# Patient Record
Sex: MALE | Race: WHITE | Employment: FULL TIME | URBAN - METROPOLITAN AREA
[De-identification: names, ages, dates, MRNs, and addresses within clinical notes are randomized per-mention and may not be internally consistent; named-entity substitution may affect disease eponyms.]

---

## 2018-06-12 ENCOUNTER — HOSPITAL ENCOUNTER (OUTPATIENT)
Dept: PHYSICAL THERAPY | Age: 48
Discharge: HOME | End: 2018-06-12
Payer: COMMERCIAL

## 2018-06-12 PROCEDURE — 97161 PT EVAL LOW COMPLEX 20 MIN: CPT

## 2018-06-12 PROCEDURE — 97140 MANUAL THERAPY 1/> REGIONS: CPT

## 2018-06-12 PROCEDURE — 97110 THERAPEUTIC EXERCISES: CPT

## 2018-06-12 NOTE — PROGRESS NOTES
[facility]  Khari [address]  [place], [address]  [phone] fax    Plan of Care/ Statement of Necessity for Physical Therapy Services    Patient name: Roman Duenas Start of Care: 2018   Referral source: Karina Jones MD : 1970    Medical Diagnosis: Bursitis of right shoulder [M75.51]   Onset Date:2018    Treatment Diagnosis: Right RTC repair   Prior Hospitalization: see medical history Provider#: [id number]   Medications: Verified on Patient summary List    Comorbidities: depression, recent Rhabdomyolysis   Prior Level of Function: Pt RHD male able to fully participate in work duties, tennis and kayaking     The Plan of Care and following information is based on the information from the initial evaluation. Assessment/ key information: Pt is a 52 y/o M presenting s/p right RTC \"small\" repair on 2018. Pt reports he initially injured the shoulder white water kayaking late  with limited response to conservative treatment. Pt demonstrates good PROM of right shoulder at this time with no end feel appreciated. Good AROM of proximal and distal joints. He does report a recent bout of Rhabdomyolysis about 6 weeks ago but has been cleared and doing well. Pt would benefit from PT to improve ROM, functional independence and return to PLOF.     Evaluation Complexity History MEDIUM  Complexity : 1-2 comorbidities / personal factors will impact the outcome/ POC ; Examination LOW Complexity : 1-2 Standardized tests and measures addressing body structure, function, activity limitation and / or participation in recreation  ;Presentation LOW Complexity : Stable, uncomplicated  ;Clinical Decision Making MEDIUM Complexity : FOTO score of 26-74  Overall Complexity Rating: LOW   Problem List: pain affecting function, decrease ROM, decrease strength, decrease ADL/ functional abilitiies, decrease activity tolerance and decrease flexibility/ joint mobility   Treatment Plan may include any combination of the following: Therapeutic exercise, Therapeutic activities, Neuromuscular re-education, Physical agent/modality, Manual therapy, Patient education, Self Care training and Functional mobility training  Patient / Family readiness to learn indicated by: asking questions and trying to perform skills  Persons(s) to be included in education: patient (P)  Barriers to Learning/Limitations: None  Patient Goal (s): Resume normal activity  Patient Self Reported Health Status: excellent  Rehabilitation Potential: excellent    Short Term Goals: To be accomplished in 2 weeks:  1. Pt will be I and compliant with HEP to promote active role in rehabilitation process. 2. Pt will demonstrate 140 deg PROM right shoulder scaption without pain for progression into AAROM. Long Term Goals: To be accomplished in 6 weeks:  1. Pt will demonstrate 130 deg left shoulder elevation AAROM to improve functional mobility and ADL ease. 2. Pt will demonstrate right shoulder ER PROM to 50 deg for improved ease of self care and grooming. 3. Pt will demonstrate 3+/5 right shoulder strength for improved ease of ADLs. 4. Pt will improve FOTO score to 66 to demonstrate improved quality of life. Frequency / Duration: Patient to be seen 2 times per week for 6 weeks. Patient/ Caregiver education and instruction: Diagnosis, prognosis, self care, activity modification and exercises   [x]  Plan of care has been reviewed with TUTU Erickson DPT, CMTPT 6/12/2018 11:57 AM    ________________________________________________________________________    I certify that the above Therapy Services are being furnished while the patient is under my care. I agree with the treatment plan and certify that this therapy is necessary.     [de-identified] Signature:____________________  Date:____________Time: _________    Please sign and return to [facility] [address]  [address] [facility] [address], [facility] [address]  [phone] fax

## 2018-06-12 NOTE — PROGRESS NOTES
PT DAILY TREATMENT NOTE     Patient Name: Maria Del Rosario Meraz  Date:2018  : 1970  [x]  Patient  Verified  Payor: Lexie Diane / Plan: [address] [place] HMO / Product Type: HMO /    In time:11:32  Out time:11:56  Total Treatment Time (min): 24  Visit #: 1 of 12    Treatment Area: Bursitis of right shoulder [M75.51]    SUBJECTIVE  Pain Level (0-10 scale): 0-1  Any medication changes, allergies to medications, adverse drug reactions, diagnosis change, or new procedure performed?: [x] No    [] Yes (see summary sheet for update)  Subjective functional status/changes:   [] No changes reported  Pt reports his pain is well controlled at this time    OBJECTIVE    Modality rationale: PD to improve the patients ability to    Min Type Additional Details    [] Estim:  []Unatt       []IFC  []Premod                        []Other:  []w/ice   []w/heat  Position:  Location:    [] Estim: []Att    []TENS instruct  []NMES                    []Other:  []w/US   []w/ice   []w/heat  Position:  Location:    []  Traction: [] Cervical       []Lumbar                       [] Prone          []Supine                       []Intermittent   []Continuous Lbs:  [] before manual  [] after manual    []  Ultrasound: []Continuous   [] Pulsed                           []1MHz   []3MHz W/cm2:  Location:    []  Iontophoresis with dexamethasone         Location: [] Take home patch   [] In clinic    []  Ice     []  heat  []  Ice massage  []  Laser   []  Anodyne Position:  Location:    []  Laser with stim  []  Other:  Position:  Location:    []  Vasopneumatic Device Pressure:       [] lo [] med [] hi   Temperature: [] lo [] med [] hi   [] Skin assessment post-treatment:  []intact []redness- no adverse reaction    []redness  adverse reaction:     8 min [x]Eval                  []Re-Eval       8 min Therapeutic Exercise:  [] See flow sheet :   Rationale: increase ROM to improve the patients ability to perform daily tasks and ADLs    8 min Manual Therapy:  PROM right shoulder/ STJ mobs   Rationale: increase ROM and increase tissue extensibility to improve ease of ADLs              With   [] TE   [] TA   [] neuro   [] other: Patient Education: [x] Review HEP    [] Progressed/Changed HEP based on:   [] positioning   [] body mechanics   [] transfers   [] heat/ice application    [] other:      Other Objective/Functional Measures: see initial eval     Pain Level (0-10 scale) post treatment: 0-1    ASSESSMENT/Changes in Function: see POC    Patient will continue to benefit from skilled PT services to modify and progress therapeutic interventions, address functional mobility deficits, address ROM deficits, address strength deficits, analyze and address soft tissue restrictions, analyze and cue movement patterns and analyze and modify body mechanics/ergonomics to attain remaining goals. []  See Plan of Care  []  See progress note/recertification  []  See Discharge Summary         Progress towards goals / Updated goals:  Short Term Goals: To be accomplished in 2 weeks:  1. Pt will be I and compliant with HEP to promote active role in rehabilitation process. 2. Pt will demonstrate 140 deg PROM right shoulder scaption without pain for progression into AAROM. Long Term Goals: To be accomplished in 6 weeks:  1. Pt will demonstrate 130 deg left shoulder elevation AAROM to improve functional mobility and ADL ease. 2. Pt will demonstrate right shoulder ER PROM to 50 deg for improved ease of self care and grooming. 3. Pt will demonstrate 3+/5 right shoulder strength for improved ease of ADLs. 4. Pt will improve FOTO score to 66 to demonstrate improved quality of life.     PLAN  []  Upgrade activities as tolerated     [x]  Continue plan of care  []  Update interventions per flow sheet       []  Discharge due to:_  []  Other:_      Du Stoll DPT, CMTPT 6/12/2018  12:09 PM    Future Appointments  Date Time Provider Melanie Reddy   6/14/2018 3:00 PM Jonathan Sharma Kobi, PTA [facility]   6/18/2018 5:00 PM Eugsimon Bills, PTA [facility]   6/20/2018 3:00 PM Florence Bills, PTA [facility]   6/25/2018 7:30 AM Sukumar Alatorre [facility]   6/28/2018 5:30 PM [address] [facility]   7/2/2018 5:30 PM Florence Bills, PTA [facility]   7/6/2018 2:00 PM Sukumar Alatorre [facility]

## 2018-06-14 ENCOUNTER — HOSPITAL ENCOUNTER (OUTPATIENT)
Dept: PHYSICAL THERAPY | Age: 48
Discharge: HOME | End: 2018-06-14
Payer: COMMERCIAL

## 2018-06-14 PROCEDURE — 97140 MANUAL THERAPY 1/> REGIONS: CPT

## 2018-06-14 PROCEDURE — 97110 THERAPEUTIC EXERCISES: CPT

## 2018-06-14 NOTE — PROGRESS NOTES
PT DAILY TREATMENT NOTE     Patient Name: Roman Duenas  Date:2018  : 1970  [x]  Patient  Verified  Payor: Feli Davidson / Plan: [employer] / Product Type: HMO /    In time:3:06  Out time:3:23  Total Treatment Time (min): 17  Visit #: 2 of 12    Treatment Area: Bursitis of right shoulder [M75.51]  Right shoulder pain [M25.511]    SUBJECTIVE  Pain Level (0-10 scale): 1/10  Any medication changes, allergies to medications, adverse drug reactions, diagnosis change, or new procedure performed?: [x] No    [] Yes (see summary sheet for update)  Subjective functional status/changes:   [] No changes reported  Pt reports no new complaints Pt reports has no difficulty sleeping and his pain is minor without taking pain meds. OBJECTIVE    9 min Therapeutic Exercise:  [x] See flow sheet :   Rationale: increase ROM and increase strength to improve the patients ability to tolerate ADLs. 8 min Manual Therapy:  PROM to right shoulder per protocol   Rationale: decrease pain, increase ROM and increase tissue extensibility to improve tolerance to functional activities. With   [] TE   [] TA   [] neuro   [] other: Patient Education: [x] Review HEP    [] Progressed/Changed HEP based on:   [] positioning   [] body mechanics   [] transfers   [] heat/ice application    [] other:      Other Objective/Functional Measures: Initiated exercises as per flow sheet. Pain Level (0-10 scale) post treatment: 1/10    ASSESSMENT/Changes in Function: Pt demonstrates good available PROM without any guarding. Patient will continue to benefit from skilled PT services to modify and progress therapeutic interventions, address functional mobility deficits, address ROM deficits, address strength deficits, analyze and address soft tissue restrictions, analyze and cue movement patterns and analyze and modify body mechanics/ergonomics to attain remaining goals.      []  See Plan of Care  []  See progress note/recertification  []  See Discharge Summary         Progress towards goals / Updated goals:  Short Term Goals: To be accomplished in 2 weeks:  1. Pt will be I and compliant with HEP to promote active role in rehabilitation process. - Met per patient report. 6/14/18  2. Pt will demonstrate 140 deg PROM right shoulder scaption without pain for progression into AAROM. Long Term Goals: To be accomplished in 6 weeks:  1. Pt will demonstrate 130 deg left shoulder elevation AAROM to improve functional mobility and ADL ease. 2. Pt will demonstrate right shoulder ER PROM to 50 deg for improved ease of self care and grooming. 3. Pt will demonstrate 3+/5 right shoulder strength for improved ease of ADLs. 4. Pt will improve FOTO score to 66 to demonstrate improved quality of life.     PLAN  []  Upgrade activities as tolerated     [x]  Continue plan of care  []  Update interventions per flow sheet       []  Discharge due to:_  []  Other:_      Florina Butt PTA 6/14/2018  3:14 PM    Future Appointments  Date Time Provider Melanie Reddy   6/18/2018 5:00 PM Florina Butt PTA [facility]   6/20/2018 3:00 PM Florina Butt PTA [facility]   6/25/2018 7:30 AM Nisha Erickson [facility]   6/28/2018 5:30 PM [address] [facility]   7/2/2018 5:30 PM Florina Butt PTA [facility]   7/6/2018 2:00 PM [address] [facility]

## 2018-06-18 ENCOUNTER — HOSPITAL ENCOUNTER (OUTPATIENT)
Dept: PHYSICAL THERAPY | Age: 48
Discharge: HOME | End: 2018-06-18
Payer: COMMERCIAL

## 2018-06-18 PROCEDURE — 97140 MANUAL THERAPY 1/> REGIONS: CPT

## 2018-06-18 PROCEDURE — 97110 THERAPEUTIC EXERCISES: CPT

## 2018-06-18 NOTE — PROGRESS NOTES
PT DAILY TREATMENT NOTE     Patient Name: Cristóbal Mak  Date:2018  : 1970  [x]  Patient  Verified  Payor: Gladys Wyatt / Plan: [employer] / Product Type: HMO /    In time:5:00  Out time:5:20  Total Treatment Time (min): 20  Visit #: 3 of 12    Treatment Area: Bursitis of right shoulder [M75.51]  Right shoulder pain [M25.511]    SUBJECTIVE  Pain Level (0-10 scale): 1/10  Any medication changes, allergies to medications, adverse drug reactions, diagnosis change, or new procedure performed?: [x] No    [] Yes (see summary sheet for update)  Subjective functional status/changes:   [] No changes reported  Pt reports no new complaints of pain. Pt reports compliance with HEP. OBJECTIVE    12 min Therapeutic Exercise:  [x] See flow sheet :   Rationale: increase ROM and increase strength to improve the patients ability to tolerate ADLs. 8 min Manual Therapy:  PROM to right shoulder   Rationale: decrease pain, increase ROM and increase tissue extensibility to improve    With   [] TE   [] TA   [] neuro   [] other: Patient Education: [x] Review HEP    [] Progressed/Changed HEP based on:   [] positioning   [] body mechanics   [] transfers   [] heat/ice application    [] other:      Other Objective/Functional Measures: Pt demonstrates improved right shoulder PROM. Pain Level (0-10 scale) post treatment: 0/10    ASSESSMENT/Changes in Function: Pt continues to have minimal PROM restrictions noted with no pain. Discussed DCing abduction pillow next week. Patient will continue to benefit from skilled PT services to modify and progress therapeutic interventions, address functional mobility deficits, address ROM deficits, address strength deficits, analyze and address soft tissue restrictions, analyze and cue movement patterns and analyze and modify body mechanics/ergonomics to attain remaining goals.      []  See Plan of Care  []  See progress note/recertification  []  See Discharge Summary Progress towards goals / Updated goals:  Short Term Goals: To be accomplished in 2 weeks:  1. Pt will be I and compliant with HEP to promote active role in rehabilitation process. - Met per patient report. 6/14/18  2. Pt will demonstrate 140 deg PROM right shoulder scaption without pain for progression into AAROM. Long Term Goals: To be accomplished in 6 weeks:  1. Pt will demonstrate 130 deg left shoulder elevation AAROM to improve functional mobility and ADL ease. 2. Pt will demonstrate right shoulder ER PROM to 50 deg for improved ease of self care and grooming. 3. Pt will demonstrate 3+/5 right shoulder strength for improved ease of ADLs.   4. Pt will improve FOTO score to 66 to demonstrate improved quality of life.       PLAN  []  Upgrade activities as tolerated     [x]  Continue plan of care  []  Update interventions per flow sheet       []  Discharge due to:_  []  Other:_      Mino Delgadillo PTA 6/18/2018  5:06 PM    Future Appointments  Date Time Provider Melanie Reddy   6/20/2018 3:00 PM Mino Delgadillo PTA [facility]   6/25/2018 7:30 AM Pankaj Powell [facility]   6/28/2018 5:30 PM [address] [facility]   7/2/2018 5:30 PM Mino Delgadillo PTA [facility]   7/6/2018 2:00 PM [id number] [facility]

## 2018-06-20 ENCOUNTER — HOSPITAL ENCOUNTER (OUTPATIENT)
Dept: PHYSICAL THERAPY | Age: 48
Discharge: HOME | End: 2018-06-20
Payer: COMMERCIAL

## 2018-06-20 PROCEDURE — 97110 THERAPEUTIC EXERCISES: CPT

## 2018-06-20 PROCEDURE — 97140 MANUAL THERAPY 1/> REGIONS: CPT

## 2018-06-20 NOTE — PROGRESS NOTES
PT DAILY TREATMENT NOTE     Patient Name: Nnamdi Varela  Date:2018  : 1970  [x]  Patient  Verified  Payor: Kp Banegas / Plan: [employer] / Product Type: HMO /    In time:3:00  Out time:3:20  Total Treatment Time (min): 20  Visit #: 3 of 12    Treatment Area: Bursitis of right shoulder [M75.51]  Right shoulder pain [M25.511]    SUBJECTIVE  Pain Level (0-10 scale): 1/10  Any medication changes, allergies to medications, adverse drug reactions, diagnosis change, or new procedure performed?: [x] No    [] Yes (see summary sheet for update)  Subjective functional status/changes:   [] No changes reported  Pt reports no significant change in symptoms. Pt reports continued compliance with HEP. OBJECTIVE    12 min Therapeutic Exercise:  [x] See flow sheet :   Rationale: increase ROM and increase strength to improve the patients ability to tolerate ADLs. 8 min Manual Therapy:  PROM to right shoulder   Rationale: decrease pain, increase ROM and increase tissue extensibility to right shoulder        With   [] TE   [] TA   [] neuro   [] other: Patient Education: [x] Review HEP    [] Progressed/Changed HEP based on:   [] positioning   [] body mechanics   [] transfers   [] heat/ice application    [] other:      Other Objective/Functional Measures: Added pulleys. Pain Level (0-10 scale) post treatment: 0/10    ASSESSMENT/Changes in Function: Pt continues to demonstrate good shoulder mobility with PROM; informed patient we will cancel appointment on  if shoulder is still doing well on . Patient will continue to benefit from skilled PT services to modify and progress therapeutic interventions, address functional mobility deficits, address ROM deficits, address strength deficits, analyze and address soft tissue restrictions, analyze and cue movement patterns and analyze and modify body mechanics/ergonomics to attain remaining goals.      []  See Plan of Care  []  See progress note/recertification  []  See Discharge Summary         Progress towards goals / Updated goals:  Short Term Goals: To be accomplished in 2 weeks:  1. Pt will be I and compliant with HEP to promote active role in rehabilitation process. - Met per patient report. 6/14/18  2. Pt will demonstrate 140 deg PROM right shoulder scaption without pain for progression into AAROM. Long Term Goals: To be accomplished in 6 weeks:  1. Pt will demonstrate 130 deg left shoulder elevation AAROM to improve functional mobility and ADL ease. 2. Pt will demonstrate right shoulder ER PROM to 50 deg for improved ease of self care and grooming. 3. Pt will demonstrate 3+/5 right shoulder strength for improved ease of ADLs. 4. Pt will improve FOTO score to 66 to demonstrate improved quality of life.     PLAN  []  Upgrade activities as tolerated     [x]  Continue plan of care  []  Update interventions per flow sheet       []  Discharge due to:_  []  Other:_      Florenec Bills PTA 6/20/2018  3:11 PM    Future Appointments  Date Time Provider Melanie Reddy   6/25/2018 7:30 AM [id number] [facility]   6/28/2018 5:30 PM [address] [place] [address] [facility]   7/2/2018 5:30 PM Florence Bills PTA [facility]   7/6/2018 2:00 PM Sukumar Alatorre [facility]

## 2018-06-25 ENCOUNTER — APPOINTMENT (OUTPATIENT)
Dept: PHYSICAL THERAPY | Age: 48
End: 2018-06-25
Payer: COMMERCIAL

## 2018-06-28 ENCOUNTER — HOSPITAL ENCOUNTER (OUTPATIENT)
Dept: PHYSICAL THERAPY | Age: 48
Discharge: HOME | End: 2018-06-28
Payer: COMMERCIAL

## 2018-06-28 PROCEDURE — 97110 THERAPEUTIC EXERCISES: CPT

## 2018-06-28 NOTE — PROGRESS NOTES
PT DAILY TREATMENT NOTE     Patient Name: Ralph Goodwin  Date:2018  : 1970  [x]  Patient  Verified  Payor: Gustavo Ty / Plan: [employer] / Product Type: HMO /    In time:5:30pm  Out time:5:45pm  Total Treatment Time (min): 15  Visit #: 5 of 12    Treatment Area: Bursitis of right shoulder [M75.51]  Right shoulder pain [M25.511]    SUBJECTIVE  Pain Level (0-10 scale): 0  Any medication changes, allergies to medications, adverse drug reactions, diagnosis change, or new procedure performed?: [x] No    [] Yes (see summary sheet for update)  Subjective functional status/changes:   [] No changes reported  Pt reports no concerns with regards to his shoulder at this time. He reports orthopaedics surgeon f/u tomorrow and is anticipated to be allowed to progress. OBJECTIVE  10 min Therapeutic Exercise:  [x] See flow sheet :   Rationale: increase ROM to improve the patients ability to improve ease of ADLs and reduce risk of contracture. 5 min Manual Therapy:  PROM R shoulder, STJ mobility assessment   Rationale: decrease pain, increase ROM and increase tissue extensibility to improve R shoulder mobility          With   [] TE   [] TA   [] neuro   [] other: Patient Education: [x] Review HEP    [] Progressed/Changed HEP based on:   [] positioning   [] body mechanics   [] transfers   [] heat/ice application    [] other:      Other Objective/Functional Measures: grossly 130 degrees PROM scaption void of pain provocation, 100 degrees ABD with minimal reports of onset of discomfort at end range, ER to > 30 degrees void of pain or limitations, some restricted IR PROM @ 90 degrees ABD limited to 30 degrees with post shoulder discomfort     Good STJ mobility in all planes    Pain Level (0-10 scale) post treatment: 0    ASSESSMENT/Changes in Function: Pt is progressing very well with PROM within current precautions.  At this time, advised pt to reduce frequency of PT to 1x/week if not allowed to progress to [facility] after orthopaedics f/u tomorrow, otherwise will plan to f/u 2x/week as appropriate. Patient will continue to benefit from skilled PT services to modify and progress therapeutic interventions, address functional mobility deficits, address ROM deficits, address strength deficits, analyze and address soft tissue restrictions, analyze and cue movement patterns and instruct in home and community integration to attain remaining goals. []  See Plan of Care  []  See progress note/recertification  []  See Discharge Summary         Progress towards goals / Updated goals:  Short Term Goals: To be accomplished in 2 weeks:  1. Pt will be I and compliant with HEP to promote active role in rehabilitation process. - Met per patient report. 6/14/18  2. Pt will demonstrate 140 deg PROM right shoulder scaption without pain for progression into AAROM. Long Term Goals: To be accomplished in 6 weeks:  1. Pt will demonstrate 130 deg left shoulder elevation AAROM to improve functional mobility and ADL ease. 2. Pt will demonstrate right shoulder ER PROM to 50 deg for improved ease of self care and grooming. 3. Pt will demonstrate 3+/5 right shoulder strength for improved ease of ADLs. 4. Pt will improve FOTO score to 66 to demonstrate improved quality of life.     PLAN  [x]  Upgrade activities as tolerated     [x]  Continue plan of care  []  Update interventions per flow sheet       []  Discharge due to:_  []  Other:_      Anuja Lang, PT, DPT, ATC 6/28/2018  5:52 PM    Future Appointments  Date Time Provider Melaine Reddy   7/2/2018 5:30 PM [facility]   7/6/2018 2:00 PM [facility]

## 2018-07-02 ENCOUNTER — HOSPITAL ENCOUNTER (OUTPATIENT)
Dept: PHYSICAL THERAPY | Age: 48
Discharge: HOME | End: 2018-07-02
Payer: COMMERCIAL

## 2018-07-02 PROCEDURE — 97110 THERAPEUTIC EXERCISES: CPT

## 2018-07-02 NOTE — PROGRESS NOTES
PT DAILY TREATMENT NOTE     Patient Name: Brittany Schneider  Date:2018  : 1970  [x]  Patient  Verified  Payor: Casey Monroy / Plan: [employer] / Product Type: HMO /    In time:5:30  Out time:5:48  Total Treatment Time (min): 18  Visit #: 6 of 12    Treatment Area: Bursitis of right shoulder [M75.51]  Right shoulder pain [M25.511]    SUBJECTIVE  Pain Level (0-10 scale): 0/10  Any medication changes, allergies to medications, adverse drug reactions, diagnosis change, or new procedure performed?: [x] No    [] Yes (see summary sheet for update)  Subjective functional status/changes:   [] No changes reported  Pt reports no new complaints of pain. Pt reports compliance with HEP. OBJECTIVE    18 min Therapeutic Exercise:  [] See flow sheet :   Rationale: increase ROM and increase strength to improve the patients ability to tolerate ADLs. With   [] TE   [] TA   [] neuro   [] other: Patient Education: [x] Review HEP    [] Progressed/Changed HEP based on:   [] positioning   [] body mechanics   [] transfers   [] heat/ice application    [] other:      Other Objective/Functional Measures: initiated isometrics (flexion/abduction.)      Pain Level (0-10 scale) post treatment: 0/10    ASSESSMENT/Changes in Function: Pt demonstrates good AA/PROM and is progressing well with mobility; No increased pain or soreness with isometrics. Patient will continue to benefit from skilled PT services to modify and progress therapeutic interventions, address functional mobility deficits, address ROM deficits, address strength deficits, analyze and address soft tissue restrictions, analyze and cue movement patterns and analyze and modify body mechanics/ergonomics to attain remaining goals. []  See Plan of Care  []  See progress note/recertification  []  See Discharge Summary         Progress towards goals / Updated goals:  Short Term Goals: To be accomplished in 2 weeks:  1.  Pt will be I and compliant with HEP to promote active role in rehabilitation process. - Met per patient report. 6/14/18  2. Pt will demonstrate 140 deg PROM right shoulder scaption without pain for progression into AAROM. - Met PROM 160 degrees right shoulder scaption. 7/2/18  Long Term Goals: To be accomplished in 6 weeks:  1. Pt will demonstrate 130 deg left shoulder elevation AAROM to improve functional mobility and ADL ease. - met with pulleys; 7/2/18  2. Pt will demonstrate right shoulder ER PROM to 50 deg for improved ease of self care and grooming. 3. Pt will demonstrate 3+/5 right shoulder strength for improved ease of ADLs. 4. Pt will improve FOTO score to 66 to demonstrate improved quality of life.     PLAN  []  Upgrade activities as tolerated     [x]  Continue plan of care  []  Update interventions per flow sheet       []  Discharge due to:_  []  Other:_      Kennedy Brown PTA 7/2/2018  5:56 PM    Future Appointments  Date Time Provider Melanie Reddy   7/10/2018 12:00 PM Rajan Dick

## 2018-07-06 ENCOUNTER — APPOINTMENT (OUTPATIENT)
Dept: PHYSICAL THERAPY | Age: 48
End: 2018-07-06
Payer: COMMERCIAL

## 2018-07-10 ENCOUNTER — HOSPITAL ENCOUNTER (OUTPATIENT)
Dept: PHYSICAL THERAPY | Age: 48
Discharge: HOME | End: 2018-07-10
Payer: COMMERCIAL

## 2018-07-10 PROCEDURE — 97110 THERAPEUTIC EXERCISES: CPT

## 2018-07-10 PROCEDURE — 97140 MANUAL THERAPY 1/> REGIONS: CPT

## 2018-07-10 NOTE — PROGRESS NOTES
PT DAILY TREATMENT NOTE     Patient Name: Radha Rojas  Date:7/10/2018  : 1970  [x]  Patient  Verified  Payor: Valentine Webb / Plan: [employer] / Product Type: PPO /    In time:12:10  Out time:12:35  Total Treatment Time (min): 25  Visit #: 7 of 12    Treatment Area: Bursitis of right shoulder [M75.51]  Right shoulder pain [M25.511]    SUBJECTIVE  Pain Level (0-10 scale): 0  Any medication changes, allergies to medications, adverse drug reactions, diagnosis change, or new procedure performed?: [x] No    [] Yes (see summary sheet for update)  Subjective functional status/changes:   [] No changes reported  \"Alright overall\" pt reports he only feels discomfort \"when I do something I'm not supposed to\"    OBJECTIVE    Modality rationale: PD to improve the patients ability to    Min Type Additional Details    [] Estim:  []Unatt       []IFC  []Premod                        []Other:  []w/ice   []w/heat  Position:  Location:    [] Estim: []Att    []TENS instruct  []NMES                    []Other:  []w/US   []w/ice   []w/heat  Position:  Location:    []  Traction: [] Cervical       []Lumbar                       [] Prone          []Supine                       []Intermittent   []Continuous Lbs:  [] before manual  [] after manual    []  Ultrasound: []Continuous   [] Pulsed                           []1MHz   []3MHz W/cm2:  Location:    []  Iontophoresis with dexamethasone         Location: [] Take home patch   [] In clinic    []  Ice     []  heat  []  Ice massage  []  Laser   []  Anodyne Position:  Location:    []  Laser with stim  []  Other:  Position:  Location:    []  Vasopneumatic Device Pressure:       [] lo [] med [] hi   Temperature: [] lo [] med [] hi   [] Skin assessment post-treatment:  []intact []redness- no adverse reaction    []redness  adverse reaction:       17 min Therapeutic Exercise:  [] See flow sheet :   Rationale: increase ROM and increase strength to improve the patients ability to perform daily tasks and ADLs    8 min Manual Therapy:  PROM right shoulder, ROM reassessment   Rationale: increase ROM and increase tissue extensibility to improve ease of ADLs and update flowsheet as necessary              With   [] TE   [] TA   [] neuro   [] other: Patient Education: [x] Review HEP    [] Progressed/Changed HEP based on:   [] positioning   [] body mechanics   [] transfers   [] heat/ice application    [] other:      Other Objective/Functional Measures: 58 deg right shoulder PROM ER in scaption plane    140+ deg AAROM elevation in supine     Pain Level (0-10 scale) post treatment: 0    ASSESSMENT/Changes in Function: Progressed into AAROM exercises today with very good tolerance. Pt reports a little stretching at end ranges but no pain. Will continue 1-2x/week progressing AAROM into AROM as tolerated    Patient will continue to benefit from skilled PT services to modify and progress therapeutic interventions, address functional mobility deficits, address ROM deficits, address strength deficits, analyze and address soft tissue restrictions, analyze and cue movement patterns and analyze and modify body mechanics/ergonomics to attain remaining goals. []  See Plan of Care  []  See progress note/recertification  []  See Discharge Summary         Progress towards goals / Updated goals:  Short Term Goals: To be accomplished in 2 weeks:  1. Pt will be I and compliant with HEP to promote active role in rehabilitation process. - Met per patient report. 6/14/18  2. Pt will demonstrate 140 deg PROM right shoulder scaption without pain for progression into AAROM. - Met PROM 160 degrees right shoulder scaption. 7/2/18  Long Term Goals: To be accomplished in 6 weeks:  1. Pt will demonstrate 130 deg left shoulder elevation AAROM to improve functional mobility and ADL ease. - met with pulleys; 7/2/18; met supine with cane 7/10/18  2.  Pt will demonstrate right shoulder ER PROM to 50 deg for improved ease of self care and grooming. Met 58 deg 7/10/18  3. Pt will demonstrate 3+/5 right shoulder strength for improved ease of ADLs. 4. Pt will improve FOTO score to 66 to demonstrate improved quality of life. Progressed to 59 7/10/18    PLAN  [x]  Upgrade activities as tolerated     [x]  Continue plan of care  []  Update interventions per flow sheet       []  Discharge due to:_  []  Other:_      Sekou Barker DPT, CMTPT 7/10/2018  12:12 PM    No future appointments.

## 2018-07-17 ENCOUNTER — HOSPITAL ENCOUNTER (OUTPATIENT)
Dept: PHYSICAL THERAPY | Age: 48
Discharge: HOME | End: 2018-07-17
Payer: COMMERCIAL

## 2018-07-17 PROCEDURE — 97110 THERAPEUTIC EXERCISES: CPT

## 2018-07-17 NOTE — PROGRESS NOTES
In [address]  [address]  [place], [address]  [phone] fax    Physical Therapy Progress Note  Patient name: Chapo Blanco Start of Care: 2018   Referral source: Jazz Coker MD : 1970                          Medical Diagnosis: Bursitis of right shoulder [M75.51] Onset Date:2018                          Treatment Diagnosis: Right RTC repair   Prior Hospitalization: see medical history Provider#: [id number]   Medications: Verified on Patient summary List    Comorbidities: depression, recent Rhabdomyolysis   Prior Level of Function: Pt RHD male able to fully participate in work duties, tennis and kayaking  Visits from [facility]: 8    Missed Visits: 0      Established Goals:        Excellent         Good         Limited            None  [x] Increased ROM   [x]  []  []  []  [x] Increased Strength  []  [x]  []  []  [x] Increased Mobility  [x]  []  []  []   [] Decreased Pain   []  []  []  []  [] Decreased Swelling  []  []  []  []    Key Functional Changes:   Short Term Goals: To be accomplished in 2 weeks:  1. Pt will be I and compliant with HEP to promote active role in rehabilitation process. - Met per patient report. 18  2. Pt will demonstrate 140 deg PROM right shoulder scaption without pain for progression into AAROM. - Met PROM 160 degrees right shoulder scaption. 18  Long Term Goals: To be accomplished in 6 weeks:  1. Pt will demonstrate 130 deg left shoulder elevation AAROM to improve functional mobility and ADL ease. - met with pulleys; 18; met supine with cane 7/10/18  2. Pt will demonstrate right shoulder ER PROM to 50 deg for improved ease of self care and grooming. Met 58 deg 7/10/18  3. Pt will demonstrate 3+/5 right shoulder strength for improved ease of ADLs. 4. Pt will improve FOTO score to 66 to demonstrate improved quality of life.  Progressed to 59 7/10/18    Updated Goals: to be achieved in 4 weeks: 1. Pt will improve FOTO score to 66 to demonstrate improved quality of life. 2. Pt will demonstrate 4/5 right shoulder strength for improved ADL ease. 3. Pt will demonstrate 150 deg OH flexion AROM for return to previous activity level. 4. Pt will demonstrate EDDY of right shoulder to C7 for improved grooming and self care. ASSESSMENT/RECOMMENDATIONS: Pt with excellent progress thus far per protocol. He is tolerating AAROM very well at this time. Pt would benefit from continued PT to progress into AROM and strength as cleared. [x]Continue therapy per initial plan/protocol at a frequency of  1-2 x per week for 4 weeks  []Continue therapy with the following recommended changes:_____________________      _____________________________________________________________________  []Discontinue therapy progressing towards or have reached established goals  []Discontinue therapy due to lack of appreciable progress towards goals  []Discontinue therapy due to lack of attendance or compliance  []Await Physician's recommendations/decisions regarding therapy  []Other:________________________________________________________________    Thank you for this referral.    Rohan Tomlin DPT, CMTPT 7/17/2018 6:26 PM  NOTE TO PHYSICIAN:  PLEASE COMPLETE THE ORDERS BELOW AND   FAX TO [facility]: [phone]  If you are unable to process this request in 24 hours please contact our office: [phone]    ? I have read the above report and request that my patient continue as recommended. ? I have read the above report and request that my patient continue therapy with the following changes/special instructions:__________________________________________________________  ? I have read the above report and request that my patient be discharged from therapy.     Physicians signature: ______________________________Date: ______Time:______

## 2018-07-17 NOTE — PROGRESS NOTES
PT DAILY TREATMENT NOTE     Patient Name: Ousmane Morgan  Date:2018  : 1970  [x]  Patient  Verified  Payor: Maco Perez / Plan: [employer] / Product Type: PPO /    In time:5:30  Out time:5:58  Total Treatment Time (min): 28  Visit #: 8 of 12    Treatment Area: Bursitis of right shoulder [M75.51]  Right shoulder pain [M25.511]    SUBJECTIVE  Pain Level (0-10 scale): 0  Any medication changes, allergies to medications, adverse drug reactions, diagnosis change, or new procedure performed?: [x] No    [] Yes (see summary sheet for update)  Subjective functional status/changes:   [] No changes reported  Pt reports he has not had any pain     OBJECTIVE    Modality rationale: PD to improve the patients ability to    Min Type Additional Details    [] Estim:  []Unatt       []IFC  []Premod                        []Other:  []w/ice   []w/heat  Position:  Location:    [] Estim: []Att    []TENS instruct  []NMES                    []Other:  []w/US   []w/ice   []w/heat  Position:  Location:    []  Traction: [] Cervical       []Lumbar                       [] Prone          []Supine                       []Intermittent   []Continuous Lbs:  [] before manual  [] after manual    []  Ultrasound: []Continuous   [] Pulsed                           []1MHz   []3MHz W/cm2:  Location:    []  Iontophoresis with dexamethasone         Location: [] Take home patch   [] In clinic    []  Ice     []  heat  []  Ice massage  []  Laser   []  Anodyne Position:  Location:    []  Laser with stim  []  Other:  Position:  Location:    []  Vasopneumatic Device Pressure:       [] lo [] med [] hi   Temperature: [] lo [] med [] hi   [] Skin assessment post-treatment:  []intact []redness- no adverse reaction    []redness  adverse reaction:       24 min Therapeutic Exercise:  [] See flow sheet :   Rationale: increase ROM and increase strength to improve the patients ability to perform daily tasks and ADLs    4 min Manual Therapy: Alternating isometrics with PROM assist to 90 deg   Rationale: increase postural awareness to improve ease of daily tasks          With   [] TE   [] TA   [] neuro   [] other: Patient Education: [x] Review HEP    [] Progressed/Changed HEP based on:   [] positioning   [] body mechanics   [] transfers   [] heat/ice application    [] other:      Other Objective/Functional Measures:      Pain Level (0-10 scale) post treatment: 0    ASSESSMENT/Changes in Function: Pt with excellent progress thus far per protocol. He is tolerating AAROM very well at this time. Will plan to initiate s/l active motions if pain still not present. Patient will continue to benefit from skilled PT services to modify and progress therapeutic interventions, address functional mobility deficits, address ROM deficits, address strength deficits, analyze and cue movement patterns and analyze and modify body mechanics/ergonomics to attain remaining goals. []  See Plan of Care  [x]  See progress note/recertification  []  See Discharge Summary         Progress towards goals / Updated goals:  Short Term Goals: To be accomplished in 2 weeks:  1. Pt will be I and compliant with HEP to promote active role in rehabilitation process. - Met per patient report. 6/14/18  2. Pt will demonstrate 140 deg PROM right shoulder scaption without pain for progression into AAROM. - Met PROM 160 degrees right shoulder scaption. 7/2/18  Long Term Goals: To be accomplished in 6 weeks:  1. Pt will demonstrate 130 deg left shoulder elevation AAROM to improve functional mobility and ADL ease. - met with pulleys; 7/2/18; met supine with cane 7/10/18  2. Pt will demonstrate right shoulder ER PROM to 50 deg for improved ease of self care and grooming. Met 58 deg 7/10/18  3. Pt will demonstrate 3+/5 right shoulder strength for improved ease of ADLs. 4. Pt will improve FOTO score to 66 to demonstrate improved quality of life.  Progressed to 59 7/10/18    PLAN  []  Upgrade activities as tolerated     []  Continue plan of care  []  Update interventions per flow sheet       []  Discharge due to:_  []  Other:_      Jorge Luis Lomas DPT, CMTPT 7/17/2018  5:40 PM    Future Appointments  Date Time Provider Melanie Reddy   7/20/2018 2:00 PM Jayda August, PTA [facility]   7/23/2018 5:30 PM Jaydaita August, PTA [facility]   7/30/2018 5:30 PM Jayda August, PTA [facility]   8/6/2018 5:30 PM Jaydaita August, PTA [facility]   8/13/2018 5:30 PM Jayda August, PTA [facility]

## 2018-07-20 ENCOUNTER — HOSPITAL ENCOUNTER (OUTPATIENT)
Dept: PHYSICAL THERAPY | Age: 48
Discharge: HOME | End: 2018-07-20
Payer: COMMERCIAL

## 2018-07-20 PROCEDURE — 97112 NEUROMUSCULAR REEDUCATION: CPT

## 2018-07-20 PROCEDURE — 97110 THERAPEUTIC EXERCISES: CPT

## 2018-07-20 NOTE — PROGRESS NOTES
PT DAILY TREATMENT NOTE     Patient Name: Randy Care  Date:2018  : 1970  [x]  Patient  Verified  Payor: José Manuel Garcia / Plan: [employer] / Product Type: PPO /    In time:2:00  Out time:2:33  Total Treatment Time (min): 33  Visit #: 1 of -8    Treatment Area: Bursitis of right shoulder [M75.51]  Right shoulder pain [M25.511]    SUBJECTIVE  Pain Level (0-10 scale): 0/10  Any medication changes, allergies to medications, adverse drug reactions, diagnosis change, or new procedure performed?: [x] No    [] Yes (see summary sheet for update)  Subjective functional status/changes:   [] No changes reported  Pt reports his shoulder is feeling good. Pt reports compliance with HEP. OBJECTIVE    23 min Therapeutic Exercise:  [x] See flow sheet :   Rationale: increase ROM and increase strength to improve the patients ability to improve tolerance to ADLs. 10 min Neuromuscular Re-education:  [x]  See flow sheet :   Rationale: increase ROM and increase strength  to improve the patients ability to tolerate ADLs. With   [] TE   [] TA   [] neuro   [] other: Patient Education: [x] Review HEP    [] Progressed/Changed HEP based on:   [] positioning   [] body mechanics   [] transfers   [] heat/ice application    [] other:      Other Objective/Functional Measures: added UBE and sidelying AROM shoulder series. Pain Level (0-10 scale) post treatment: 0/10    ASSESSMENT/Changes in Function: Pt demonstrates good scapulohumeral rhythm with AROM shoulder movements. Patient will continue to benefit from skilled PT services to modify and progress therapeutic interventions, address functional mobility deficits, address ROM deficits, address strength deficits, analyze and address soft tissue restrictions, analyze and cue movement patterns and analyze and modify body mechanics/ergonomics to attain remaining goals.      []  See Plan of Care  []  See progress note/recertification  []  See Discharge Summary         Progress towards goals / Updated goals:  Updated Goals: to be achieved in 4 weeks:  1. Pt will improve FOTO score to 66 to demonstrate improved quality of life. 2. Pt will demonstrate 4/5 right shoulder strength for improved ADL ease. 3. Pt will demonstrate 150 deg OH flexion AROM for return to previous activity level. 4. Pt will demonstrate EDDY of right shoulder to C7 for improved grooming and self care.      PLAN  []  Upgrade activities as tolerated     [x]  Continue plan of care  []  Update interventions per flow sheet       []  Discharge due to:_  []  Other:_      Isabelle Brandon, PTA 7/20/2018  2:08 PM    Future Appointments  Date Time Provider Melanie Reddy   7/23/2018 5:30 PM Isabelle Brandon, PTA [facility]   7/30/2018 5:30 PM Isabelle Brandon, PTA [facility]   8/6/2018 5:30 PM Isabelle Brandon, PTA [facility]   8/13/2018 5:30 PM Isabelle Brandon, PTA [facility]

## 2018-07-23 ENCOUNTER — HOSPITAL ENCOUNTER (OUTPATIENT)
Dept: PHYSICAL THERAPY | Age: 48
Discharge: HOME | End: 2018-07-23
Payer: COMMERCIAL

## 2018-07-23 PROCEDURE — 97110 THERAPEUTIC EXERCISES: CPT

## 2018-07-23 PROCEDURE — 97112 NEUROMUSCULAR REEDUCATION: CPT

## 2018-07-23 NOTE — PROGRESS NOTES
PT DAILY TREATMENT NOTE     Patient Name: Kristen Hernández  Date:2018  : 1970  [x]  Patient  Verified  Payor: Yasminecurly Ervin / Plan: [employer] / Product Type: PPO /    In time:5:30  Out time:6:00  Total Treatment Time (min): 30  Visit #: 2 of 4-8    Treatment Area: Bursitis of right shoulder [M75.51]  Right shoulder pain [M25.511]    SUBJECTIVE  Pain Level (0-10 scale): 0/10  Any medication changes, allergies to medications, adverse drug reactions, diagnosis change, or new procedure performed?: [x] No    [] Yes (see summary sheet for update)  Subjective functional status/changes:   [] No changes reported  Pt reports no new complaints of pain. Pt reports soreness after last session. OBJECTIVE    30 min Therapeutic Exercise:  [x] See flow sheet :   Rationale: increase ROM and increase strength to improve the patients ability to tolerate ADLs. With   [] TE   [] TA   [] neuro   [] other: Patient Education: [x] Review HEP    [] Progressed/Changed HEP based on:   [] positioning   [] body mechanics   [] transfers   [] heat/ice application    [] other:      Other Objective/Functional Measures: added bent over rows, shoulder extensions and tricep extensions. Pain Level (0-10 scale) post treatment: 0/10    ASSESSMENT/Changes in Function: Pt demonstrates good scapulohumeral rhythm with shoulder elevation greater than 90 degrees without any UT compensations. Patient will continue to benefit from skilled PT services to modify and progress therapeutic interventions, address functional mobility deficits, address ROM deficits, address strength deficits, analyze and address soft tissue restrictions, analyze and cue movement patterns and analyze and modify body mechanics/ergonomics to attain remaining goals. []  See Plan of Care  []  See progress note/recertification  []  See Discharge Summary         Progress towards goals / Updated goals:  Updated Goals: to be achieved in 4 weeks:  1. Pt will improve FOTO score to 66 to demonstrate improved quality of life. 2. Pt will demonstrate 4/5 right shoulder strength for improved ADL ease. 3. Pt will demonstrate 150 deg OH flexion AROM for return to previous activity level. -progressing, AAROM OH flexion 170 degrees. 7/23/18  4. Pt will demonstrate EDDY of right shoulder to C7 for improved grooming and self care.      PLAN  []  Upgrade activities as tolerated     [x]  Continue plan of care  []  Update interventions per flow sheet       []  Discharge due to:_  []  Other:_      Aung Ice, PTA 7/23/2018  5:44 PM    Future Appointments  Date Time Provider Melanie Reddy   7/30/2018 5:30 PM Aung Ice, PTA [facility]   8/6/2018 5:30 PM McKinley Ice, PTA [facility]   8/13/2018 5:30 PM McKinley Ice, PTA [facility]

## 2018-07-30 ENCOUNTER — HOSPITAL ENCOUNTER (OUTPATIENT)
Dept: PHYSICAL THERAPY | Age: 48
Discharge: HOME | End: 2018-07-30
Payer: COMMERCIAL

## 2018-07-30 PROCEDURE — 97110 THERAPEUTIC EXERCISES: CPT

## 2018-07-30 NOTE — PROGRESS NOTES
PT DAILY TREATMENT NOTE  Patient Name: Clyde Baxter Date:2018 : 1970 [x]  Patient  Verified Payor: Shannen Ty / Plan: [employer] / Product Type: PPO / In time:5:30  Out time:6:00 Total Treatment Time (min): 30 Visit #: 3 of 4-8 Treatment Area: Bursitis of right shoulder [M75.51] Right shoulder pain [M25.511] SUBJECTIVE Pain Level (0-10 scale): 0/10 Any medication changes, allergies to medications, adverse drug reactions, diagnosis change, or new procedure performed?: [x] No    [] Yes (see summary sheet for update) Subjective functional status/changes:   [] No changes reported Pt reports no new complaints of pain Pt reports his shoulder is okay but he over OBJECTIVE 30 min Therapeutic Exercise:  [x] See flow sheet :  
Rationale: increase ROM and increase strength to improve the patients ability to tolerate ADLs. With 
 [] TE 
 [] TA 
 [] neuro 
 [] other: Patient Education: [x] Review HEP [] Progressed/Changed HEP based on:  
[] positioning   [] body mechanics   [] transfers   [] heat/ice application   
[] other:   
 
Other Objective/Functional Measures: added weight to bend over rows, extension, tricep extension. Pain Level (0-10 scale) post treatment: 0/10 ASSESSMENT/Changes in Function: Pt demonstrates good right shoulder AROM with no pain at end range in all planes. Patient will continue to benefit from skilled PT services to modify and progress therapeutic interventions, address functional mobility deficits, address ROM deficits, address strength deficits, analyze and address soft tissue restrictions, analyze and cue movement patterns and analyze and modify body mechanics/ergonomics to attain remaining goals. []  See Plan of Care 
[]  See progress note/recertification 
[]  See Discharge Summary Progress towards goals / Updated goals: 
Updated Goals: to be achieved in 4 weeks: 1.  Pt will improve FOTO score to 66 to demonstrate improved quality of life. 2. Pt will demonstrate 4/5 right shoulder strength for improved ADL ease. 3. Pt will demonstrate 150 deg OH flexion AROM for return to previous activity level. -progressing, AAROM OH flexion 170 degrees. 7/23/18 4. Pt will demonstrate EDDY of right shoulder to C7 for improved grooming and self care. PLAN 
[]  Upgrade activities as tolerated     [x]  Continue plan of care 
[]  Update interventions per flow sheet      
[]  Discharge due to:_ 
[]  Other:_ Tabathazarina Willss, PTA 7/30/2018  5:44 PM 
 
Future Appointments Date Time Provider Melanie Reddy 8/6/2018 5:30 PM Tabatha Blades, PTA [facility]  
8/13/2018 5:30 PM Tabatha Blades, PTA [facility]

## 2018-08-06 ENCOUNTER — HOSPITAL ENCOUNTER (OUTPATIENT)
Dept: PHYSICAL THERAPY | Age: 48
Discharge: HOME | End: 2018-08-06
Payer: COMMERCIAL

## 2018-08-06 PROCEDURE — 97112 NEUROMUSCULAR REEDUCATION: CPT

## 2018-08-06 PROCEDURE — 97110 THERAPEUTIC EXERCISES: CPT

## 2018-08-06 NOTE — PROGRESS NOTES
PT DAILY TREATMENT NOTE     Patient Name: Kristen Hernández  Date:2018  : 1970  [x]  Patient  Verified  Payor: Yasminecurly Hatchg / Plan: [place] [employer] / Product Type: PPO /    In time:5:30  Out time:6:03  Total Treatment Time (min): 33  Visit #: 4 of 4-8    Treatment Area: Bursitis of right shoulder [M75.51]  Right shoulder pain [M25.511]    SUBJECTIVE  Pain Level (0-10 scale): 0/10  Any medication changes, allergies to medications, adverse drug reactions, diagnosis change, or new procedure performed?: [x] No    [] Yes (see summary sheet for update)  Subjective functional status/changes:   [] No changes reported  Pt reports no new complaints. Pt reports compliance with HEP. OBJECTIVE    23 min Therapeutic Exercise:  [x] See flow sheet :   Rationale: increase ROM and increase strength to improve the patients ability to tolerate ADLs. 10 min Neuromuscular Re-education:  [x]  See flow sheet :   Rationale: increase strength and improve coordination  to improve the patients ability to tolerate ADLs. With   [] TE   [] TA   [] neuro   [] other: Patient Education: [x] Review HEP    [] Progressed/Changed HEP based on:   [] positioning   [] body mechanics   [] transfers   [] heat/ice application    [] other:      Other Objective/Functional Measures: FOTO: 60    EDDY to C7    Pain Level (0-10 scale) post treatment: 1/10    ASSESSMENT/Changes in Function: Pt demonstrates excellent AROM with little to no limitations. No pain reported with progressed exercises or end range AAROM. Pt is progressing into AROM phase with no adverse reactions.      Patient will continue to benefit from skilled PT services to modify and progress therapeutic interventions, address functional mobility deficits, address ROM deficits, address strength deficits, analyze and address soft tissue restrictions, analyze and cue movement patterns, analyze and modify body mechanics/ergonomics and assess and modify postural abnormalities to attain remaining goals. []  See Plan of Care  [x]  See progress note/recertification  []  See Discharge Summary         Progress towards goals / Updated goals:  Updated Goals: to be achieved in 4 weeks:  1. Pt will improve FOTO score to 66 to demonstrate improved quality of life. - Progressed to 60. 8/6/18  2. Pt will demonstrate 4/5 right shoulder strength for improved ADL ease. - progressing 3+/5. 8/6/18  3. Pt will demonstrate 150 deg OH flexion AROM for return to previous activity level. -progressing, AAROM OH flexion 170 degrees. 7/23/18  4. Pt will demonstrate EDDY of right shoulder to C7 for improved grooming and self care. - Met.  EDDY to C7; 8/6/18    PLAN  []  Upgrade activities as tolerated     [x]  Continue plan of care  []  Update interventions per flow sheet       []  Discharge due to:_  []  Other:_      Mary Jane Vazquez PTA 8/6/2018  5:48 PM    Future Appointments  Date Time Provider Melanie Reddy   8/13/2018 5:30 PM Mary Jane Vazquez PTA [facility]

## 2018-08-09 NOTE — PROGRESS NOTES
[address] [facility] [address]  [facility], [address]  [place], [address]  [phone] fax    Physical Therapy Progress Note  Patient name: Shelia Galvan Start of Care: 2018   Referral source: Tod Saini MD : 1970                          Medical Diagnosis: Bursitis of right shoulder [M75.51] Onset Date:2018                          Treatment Diagnosis: Right RTC repair   Prior Hospitalization: see medical history Provider#: [id number]   Medications: Verified on Patient summary List    Comorbidities: depression, recent Rhabdomyolysis   Prior Level of Function: Pt RHD male able to fully participate in work duties, tennis and kayaking  Visits from [place] of Care: 12    Missed Visits: 0      Established Goals:        Excellent         Good         Limited            None  [x] Increased ROM   []  [x]  []  []  [x] Increased Strength  []  []  [x]  []  [] Increased Mobility  []  []  []  []   [] Decreased Pain   []  []  []  []  [] Decreased Swelling  []  []  []  []    Key Functional Changes:   Updated Goals: to be achieved in 4 weeks:  1. Pt will improve FOTO score to 66 to demonstrate improved quality of life. - Progressed to 60. 18  2. Pt will demonstrate 4/5 right shoulder strength for improved ADL ease. - progressing 3+/5. 18  3. Pt will demonstrate 150 deg OH flexion AROM for return to previous activity level. -progressing, AAROM OH flexion 170 degrees. 18  4. Pt will demonstrate EDDY of right shoulder to C7 for improved grooming and self care. - Met. EDDY to C7; 18    Updated Goals: to be achieved in 4 weeks:  1. Pt will improve FOTO score to 66 to demonstrate improved quality of life. - Progressed to 60. 8  2. Pt will demonstrate 4/5 right shoulder strength for improved ADL ease. - progressing 3+/5. 18  3. Pt will demonstrate 150 deg OH flexion AROM for return to previous activity level.     ASSESSMENT/RECOMMENDATIONS: Pt continues to progress well through [facility] into AROM. He would benefit from brief continuance of PT to improve strength and activity performance. [x]Continue therapy per initial plan/protocol at a frequency of  1-2 x per week for 4 weeks  []Continue therapy with the following recommended changes:_____________________      _____________________________________________________________________  []Discontinue therapy progressing towards or have reached established goals  []Discontinue therapy due to lack of appreciable progress towards goals  []Discontinue therapy due to lack of attendance or compliance  []Await Physician's recommendations/decisions regarding therapy  []Other:________________________________________________________________    Thank you for this referral.    Bibiana Felix DPT, CMTPT 8/9/2018 1:20 PM  NOTE TO PHYSICIAN:  PLEASE COMPLETE THE ORDERS BELOW AND   FAX TO [facility]: [phone]  If you are unable to process this request in 24 hours please contact our office: [phone] I have read the above report and request that my patient continue as recommended. ? I have read the above report and request that my patient continue therapy with the following changes/special instructions:__________________________________________________________  ? I have read the above report and request that my patient be discharged from therapy.     Physicians signature: ______________________________Date: ______Time:______

## 2018-08-13 ENCOUNTER — HOSPITAL ENCOUNTER (OUTPATIENT)
Dept: PHYSICAL THERAPY | Age: 48
Discharge: HOME | End: 2018-08-13
Payer: COMMERCIAL

## 2018-08-13 PROCEDURE — 97112 NEUROMUSCULAR REEDUCATION: CPT

## 2018-08-13 PROCEDURE — 97110 THERAPEUTIC EXERCISES: CPT

## 2018-08-13 NOTE — PROGRESS NOTES
PT DAILY TREATMENT NOTE - [facility]     Patient Name: Santiago Going  Date:2018  : 1970  [x]  Patient  Verified  Payor: Alie Garza / Plan: [place] [employer] [place] / Product Type: PPO /    In time:5:30  Out time:6:01  Total Treatment Time (min): 31  Visit #: 2 of 4    Treatment Area: Bursitis of right shoulder [M75.51]  Right shoulder pain [M25.511]    SUBJECTIVE  Pain Level (0-10 scale): 1/10  Any medication changes, allergies to medications, adverse drug reactions, diagnosis change, or new procedure performed?: [x] No    [] Yes (see summary sheet for update)  Subjective functional status/changes:   [] No changes reported  Pt reports no new complaints. Pt reports compliance with HEP. OBJECTIVE    21 min Therapeutic Exercise:  [x] See flow sheet :   Rationale: increase ROM and increase strength to improve the patients ability to tolerate ADLs. 10 min Neuromuscular Re-education:  []  See flow sheet :   Rationale: increase strength, improve coordination and increase proprioception  to improve the patients ability to tolerate ADLs. With   [] TE   [] TA   [] neuro   [] other: Patient Education: [x] Review HEP    [] Progressed/Changed HEP based on:   [] positioning   [] body mechanics   [] transfers   [] heat/ice application    [] other:      Other Objective/Functional Measures: added wall push ups     Pain Level (0-10 scale) post treatment: 1/10    ASSESSMENT/Changes in Function: Pt demonstrates good scapulohumeral rhythm with shoulder elevation AG. Patient will continue to benefit from skilled PT services to modify and progress therapeutic interventions, address functional mobility deficits, address ROM deficits, address strength deficits, analyze and address soft tissue restrictions and analyze and cue movement patterns to attain remaining goals.      []  See Plan of Care  []  See progress note/recertification  []  See Discharge Summary         Progress towards goals / Updated goals:  Updated Goals: to be achieved in 4 weeks:  1. Pt will improve FOTO score to 66 to demonstrate improved quality of life. - Progressed to 60. 8/6/18  2. Pt will demonstrate 4/5 right shoulder strength for improved ADL ease. - progressing 3+/5. 8/6/18  3. Pt will demonstrate 150 deg OH flexion AROM for return to previous activity level. -progressing, AAROM OH flexion 170 degrees. 7/23/18  4. Pt will demonstrate EDDY of right shoulder to C7 for improved grooming and self care. - Met. EDDY to C7; 8/6/18     Updated Goals: to be achieved in 4 weeks:  1. Pt will improve FOTO score to 66 to demonstrate improved quality of life. - Progressed to 60. 8/6/18  2. Pt will demonstrate 4/5 right shoulder strength for improved ADL ease. - progressing 3+/5. 8/6/18  3. Pt will demonstrate 150 deg OH flexion AROM for return to previous activity level. PLAN  []  Upgrade activities as tolerated     [x]  Continue plan of care  []  Update interventions per flow sheet       []  Discharge due to:_  []  Other:_      Inge Pretty PTA 8/13/2018  5:35 PM    No future appointments.

## 2018-08-21 ENCOUNTER — HOSPITAL ENCOUNTER (OUTPATIENT)
Dept: PHYSICAL THERAPY | Age: 48
Discharge: HOME | End: 2018-08-21
Payer: COMMERCIAL

## 2018-08-21 PROCEDURE — 97112 NEUROMUSCULAR REEDUCATION: CPT

## 2018-08-21 PROCEDURE — 97110 THERAPEUTIC EXERCISES: CPT

## 2018-08-21 NOTE — PROGRESS NOTES
PT DAILY TREATMENT NOTE     Patient Name: Angie Ramirez  Date:2018  : 1970  [x]  Patient  Verified  Payor: Glenis Ramirez / Plan: [place] [employer] P[place] / Product Type: PPO /    In time:12:34  Out time:1:25  Total Treatment Time (min): 51  Visit #: 3 of 4    Treatment Area: Bursitis of right shoulder [M75.51]  Right shoulder pain [M25.511]    SUBJECTIVE  Pain Level (0-10 scale): 0  Any medication changes, allergies to medications, adverse drug reactions, diagnosis change, or new procedure performed?: [x] No    [] Yes (see summary sheet for update)  Subjective functional status/changes:   [] No changes reported  Pt notes his shoulder feels good and has no new complaints at this time. OBJECTIVE    26 min Therapeutic Exercise:  [x] See flow sheet :   Rationale: increase ROM and increase strength to improve the patients ability to increase ease with overhead reaching     25 min Neuromuscular Re-education:  []  See flow sheet :Emphasis on activation of RTC and scapular stabilizers to increase right shoulder proprioception and kinesthetic  awarness   Rationale: increase strength, improve coordination and increase proprioception  to improve the patients ability to increase ease with ADLs          With   [] TE   [] TA   [] neuro   [] other: Patient Education: [x] Review HEP    [] Progressed/Changed HEP based on:   [] positioning   [] body mechanics   [] transfers   [] heat/ice application    [] other:           Pain Level (0-10 scale) post treatment: 0    ASSESSMENT/Changes in Function: Progressed pt in strengthening exercises today. Pt notes fatigue with exercises but no increases in pain. Pt required cues for eccentric control while performing S/L shoulder abduction and external rotation.     Patient will continue to benefit from skilled PT services to modify and progress therapeutic interventions, address functional mobility deficits, address ROM deficits, address strength deficits, analyze and address soft tissue restrictions and analyze and cue movement patterns to attain remaining goals. [x]  See Plan of Care  []  See progress note/recertification  []  See Discharge Summary         Progress towards goals / Updated goals:  Updated Goals: to be achieved in 4 weeks:  1. Pt will improve FOTO score to 66 to demonstrate improved quality of life. - Progressed to 60. 8/6/18  2. Pt will demonstrate 4/5 right shoulder strength for improved ADL ease. - progressing 4/5. 8/21/18  3. Pt will demonstrate 150 deg OH flexion AROM for return to previous activity level. -progressing, AAROM OH flexion 170 degrees. 7/23/18  4. Pt will demonstrate EDDY of right shoulder to C7 for improved grooming and self care. - Met. EDDY to C7; 8/6/18      Updated Goals: to be achieved in 4 weeks:  1. Pt will improve FOTO score to 66 to demonstrate improved quality of life. - Progressed to 60. 8/6/18  2. Pt will demonstrate 4/5 right shoulder strength for improved ADL ease. - progressing 3+/5. 8/6/18  3.  Pt will demonstrate 150 deg OH flexion AROM for return to previous activity level.       PLAN  [x]  Upgrade activities as tolerated     [x]  Continue plan of care  []  Update interventions per flow sheet       []  Discharge due to:_  []  Other:_      Ryne Carranza 8/21/2018  12:55 PM    Future Appointments  Date Time Provider Melanie Reddy   8/29/2018 12:00 PM [facility]   9/6/2018 5:30 PM [facility]   9/10/2018 5:30 PM [facility]

## 2018-08-29 ENCOUNTER — HOSPITAL ENCOUNTER (OUTPATIENT)
Dept: PHYSICAL THERAPY | Age: 48
Discharge: HOME | End: 2018-08-29
Payer: COMMERCIAL

## 2018-08-29 PROCEDURE — 97110 THERAPEUTIC EXERCISES: CPT

## 2018-08-29 PROCEDURE — 97112 NEUROMUSCULAR REEDUCATION: CPT

## 2018-08-29 NOTE — PROGRESS NOTES
PT DAILY TREATMENT NOTE  Patient Name: Edna Chand Date:2018 : 1970 [x]  Patient  Verified Payor: Luis Miguel Sampson / Plan: [place] [employer] [place] / Product Type: PPO / In time:12:00  Out time:12:31 Total Treatment Time (min): 31 Visit #: 3 of 4 Treatment Area: Bursitis of right shoulder [M75.51] Right shoulder pain [M25.511] SUBJECTIVE Pain Level (0-10 scale): 0/10 Any medication changes, allergies to medications, adverse drug reactions, diagnosis change, or new procedure performed?: [x] No    [] Yes (see summary sheet for update) Subjective functional status/changes:   [] No changes reported Pt denies pain. Pt reports compliance with HEP. OBJECTIVE 21 min Therapeutic Exercise:  [x] See flow sheet :  
Rationale: increase ROM and increase strength to improve the patients ability to tolerate ADLs. 10 min Neuromuscular Re-education:  [x]  See flow sheet :  
Rationale: increase strength, improve coordination and increase proprioception  to improve the patients ability to perform functional activities. With 
 [] TE 
 [] TA 
 [] neuro 
 [] other: Patient Education: [x] Review HEP [] Progressed/Changed HEP based on:  
[] positioning   [] body mechanics   [] transfers   [] heat/ice application   
[] other:   
 
Other Objective/Functional Measures: Added weight with full cans. Pain Level (0-10 scale) post treatment: 0/10 ASSESSMENT/Changes in Function: Pt demonstrates good form with all exercises slight c/o pain with horizontal adduction at end range. Patient will continue to benefit from skilled PT services to modify and progress therapeutic interventions, address functional mobility deficits, address ROM deficits, address strength deficits, analyze and address soft tissue restrictions, analyze and cue movement patterns and analyze and modify body mechanics/ergonomics to attain remaining goals. []  See Plan of Care 
[]  See progress note/recertification []  See Discharge Summary Progress towards goals / Updated goals: 
Updated Goals: to be achieved in 4 weeks: 1. Pt will improve FOTO score to 66 to demonstrate improved quality of life. - Progressed to 60. 8/6/18 2. Pt will demonstrate 4/5 right shoulder strength for improved ADL ease. - progressing 3+/5. 8/6/18 3. Pt will demonstrate 150 deg OH flexion AROM for return to previous activity level. PLAN 
[]  Upgrade activities as tolerated     [x]  Continue plan of care 
[]  Update interventions per flow sheet      
[]  Discharge due to:_ 
[]  Other:_ Felix Risk, PTA 8/29/2018  12:14 PM 
 
Future Appointments Date Time Provider Melanie Reddy 9/6/2018 5:30 PM Felix Risk, PTA [facility]  
9/10/2018 5:30 PM Felix Risk, PTA [facility]

## 2018-09-06 ENCOUNTER — HOSPITAL ENCOUNTER (OUTPATIENT)
Dept: PHYSICAL THERAPY | Age: 48
Discharge: HOME | End: 2018-09-06
Payer: COMMERCIAL

## 2018-09-06 PROCEDURE — 97112 NEUROMUSCULAR REEDUCATION: CPT

## 2018-09-06 PROCEDURE — 97110 THERAPEUTIC EXERCISES: CPT

## 2018-09-06 NOTE — PROGRESS NOTES
PT DAILY TREATMENT NOTE  Patient Name: Angie Ramirez Date:2018 : 1970 [x]  Patient  Verified Payor: Glenis Ramirez / Plan: [place] [employer] PPO / Product Type: PPO / In time:5:30  Out time:6:05 Total Treatment Time (min): 35 Visit #: 4 of 4 Treatment Area: Bursitis of right shoulder [M75.51] Right shoulder pain [M25.511] SUBJECTIVE Pain Level (0-10 scale): 0/10 Any medication changes, allergies to medications, adverse drug reactions, diagnosis change, or new procedure performed?: [x] No    [] Yes (see summary sheet for update) Subjective functional status/changes:   [] No changes reported Pt reports no new complaints of pain. Pt reports compliance with HEP. OBJECTIVE 25 min Therapeutic Exercise:  [x] See flow sheet :  
Rationale: increase ROM and increase strength to improve the patients ability to tolerate ADLs. 10 min Neuromuscular Re-education:  [x]  See flow sheet :  
Rationale: increase strength, improve coordination and increase proprioception  to improve the patients ability to perform functional activities. With 
 [] TE 
 [] TA 
 [] neuro 
 [] other: Patient Education: [x] Review HEP [] Progressed/Changed HEP based on:  
[] positioning   [] body mechanics   [] transfers   [] heat/ice application   
[] other:   
 
Other Objective/Functional Measures: Right shoulder AROM flexion 170 degrees. FOTO: 75  
 
Pain Level (0-10 scale) post treatment: 0/10 ASSESSMENT/Changes in Function: Pt has met all goals and has returned to weight lifting at the gym. Pt is DC'd to continue strengthening program at gym. []  See Plan of Care 
[]  See progress note/recertification 
[x]  See Discharge Summary Progress towards goals / Updated goals: 
Updated Goals: to be achieved in 4 weeks: 1. Pt will improve FOTO score to 66 to demonstrate improved quality of life.-Met; FOTO score 75. 9 2. Pt will demonstrate 4/5 right shoulder strength for improved ADL ease. - Met right shoulder MMT 4+/5. 9/6/18 3. Pt will demonstrate 150 deg OH flexion AROM for return to previous activity level. - Met right shoulder flexion to 170 degrees. 9/6/18 PLAN 
[]  Upgrade activities as tolerated     []  Continue plan of care 
[]  Update interventions per flow sheet [x]  Discharge due to:_ 
[]  Other:_ Len Hale PTA 9/6/2018  5:42 PM 
 
Future Appointments Date Time Provider Melanie Reddy 9/10/2018 5:30 PM Len Hale PTA [facility]

## 2018-09-10 ENCOUNTER — APPOINTMENT (OUTPATIENT)
Dept: PHYSICAL THERAPY | Age: 48
End: 2018-09-10
Payer: COMMERCIAL

## 2018-09-10 NOTE — PROGRESS NOTES
In [address]  [place] [address] [place], [address] 
[phone] fax Physical Therapy Discharge Summary Patient name: Chiquita Santos Start of Care: 2018 Referral source: Farrukh Christy MD : 1970                         
Medical Diagnosis: Bursitis of right shoulder [M75.51] Onset Date:2018                         
Treatment Diagnosis: Right RTC repair Prior Hospitalization: see medical history Provider#: [id number] Medications: Verified on Patient summary List  
 Comorbidities: depression, recent Rhabdomyolysis 
 Prior Level of Function: Pt RHD male able to fully participate in work duties, tennis and kayaking Visits from Start of Care: 16    Missed Visits: 0 Reporting Period : 2018 to 2018 Summary of Care: 
Updated Goals: to be achieved in 4 weeks: 1. Pt will improve FOTO score to 66 to demonstrate improved quality of life.-Met; FOTO score 75. 9 2. Pt will demonstrate 4/5 right shoulder strength for improved ADL ease. - Met right shoulder MMT 4+/5. 18 3. Pt will demonstrate 150 deg OH flexion AROM for return to previous activity level. - Met right shoulder flexion to 170 degrees. 18 ASSESSMENT/RECOMMENDATIONS: Pt has progressed well through all phases of PT. He reports that his MD has cleared him to return to gym activities. Will D/C at this time 
 
[x]Discontinue therapy: [x]Patient has reached or is progressing toward set goals []Patient is non-compliant or has abdicated 
    []Due to lack of appreciable progress towards set goals Josh Valente, SILVERIOT, CMTPT 9/10/2018 7:43 AM

## 2018-12-12 ENCOUNTER — HOSPITAL ENCOUNTER (OUTPATIENT)
Dept: CT IMAGING | Age: 48
Discharge: HOME | End: 2018-12-12
Payer: SELF-PAY

## 2018-12-12 DIAGNOSIS — Z13.6 ENCOUNTER FOR SCREENING FOR CARDIOVASCULAR DISORDERS: ICD-10-CM

## 2018-12-12 PROCEDURE — 75571 CT HRT W/O DYE W/CA TEST: CPT

## 2018-12-21 ENCOUNTER — TELEPHONE (OUTPATIENT)
Dept: CARDIAC CATH/INVASIVE PROCEDURES | Age: 48
End: 2018-12-21

## 2018-12-21 NOTE — TELEPHONE ENCOUNTER
Pt called and informed of coronary calcium score of 0. All questions answered and pt verbalized understanding. Pt informed test results were faxed to PCP. Instructed to follow up with PCP for any further questions or concerns.

## 2022-02-16 ENCOUNTER — OFFICE VISIT (OUTPATIENT)
Dept: ORTHOPEDIC SURGERY | Age: 52
End: 2022-02-16
Payer: COMMERCIAL

## 2022-02-16 VITALS
HEART RATE: 68 BPM | HEIGHT: 71 IN | TEMPERATURE: 97.7 F | BODY MASS INDEX: 29.82 KG/M2 | RESPIRATION RATE: 15 BRPM | WEIGHT: 213 LBS | OXYGEN SATURATION: 97 %

## 2022-02-16 DIAGNOSIS — M77.11 RIGHT LATERAL EPICONDYLITIS: Primary | ICD-10-CM

## 2022-02-16 PROCEDURE — 99203 OFFICE O/P NEW LOW 30 MIN: CPT | Performed by: ORTHOPAEDIC SURGERY

## 2022-02-16 PROCEDURE — 20551 NJX 1 TENDON ORIGIN/INSJ: CPT | Performed by: ORTHOPAEDIC SURGERY

## 2022-02-16 RX ORDER — IBUPROFEN 200 MG
200 TABLET ORAL
COMMUNITY

## 2022-02-16 RX ORDER — TRIAMCINOLONE ACETONIDE 40 MG/ML
40 INJECTION, SUSPENSION INTRA-ARTICULAR; INTRAMUSCULAR ONCE
Status: COMPLETED | OUTPATIENT
Start: 2022-02-16 | End: 2022-02-16

## 2022-02-16 RX ADMIN — TRIAMCINOLONE ACETONIDE 40 MG: 40 INJECTION, SUSPENSION INTRA-ARTICULAR; INTRAMUSCULAR at 16:15

## 2022-02-16 NOTE — PROGRESS NOTES
Patient: Juli Garcia                MRN: [id number]       SSN: [id number]  YOB: 1970        AGE: 49 y.o. SEX: male  Body mass index is 29.71 kg/m². PCP: Ruthie Jasso MD  02/16/22    CHIEF COMPLAINT: Right elbow pain 6/10    HPI: Juli Garcia is a 49 y.o. male patient who presents to the office with 5 to 6 months of right elbow lateral pain. He has a history of lateral epicondylitis that waxes and wanes. Unfortunate this most recent bout has been unable to resolve. Has been doing a lot of home improvement projects including remaking his deck which is caused pain on the outside part of the elbow. He has been doing home exercises and he wears a counterforce brace without resolution of his symptoms. Past Medical History:   Diagnosis Date    Depression     Dyslipidemia     History of echocardiogram 8/17/04    Normal. EF 60-65%.  Normal cardiac stress test 8/17/04    Maximal stress negative for ischemia. Ex time: 14 mins    Sterilization        Family History   Problem Relation Age of Onset    Coronary Art Dis Other     Obesity Mother     Depression Mother     High Cholesterol Father        Current Outpatient Medications   Medication Sig Dispense Refill    ibuprofen (MOTRIN) 200 mg tablet Take 200 mg by mouth every six (6) hours as needed for Pain.  ESCITALOPRAM OXALATE (ESCITALOPRAM PO) Take 20 mg by mouth.  simvastatin (ZOCOR) 40 mg tablet TAKE 1 TABLET BY MOUTH NIGHTLY (Patient not taking: Reported on 2/16/2022) 90 Tab 1    SIMVASTATIN PO Take  by mouth. (Patient not taking: Reported on 2/16/2022)      HYDROcodone-acetaminophen (NORCO) 5-325 mg per tablet Take one tablet one time one hour prior to procedure.  (Patient not taking: Reported on 2/16/2022) 1 Tab 0       Allergies   Allergen Reactions    Crab Hives    Simvastatin Other (comments)     rhabdomyolysis       Past Surgical History:   Procedure Laterality Date    HX VASECTOMY  1/30/2015    [place] ANESTH,SURGERY OF SHOULDER Right 2018    rotator cuff repair       Social History     Socioeconomic History    Marital status: LEGALLY      Spouse name: Not on file    Number of children: Not on file    Years of education: Not on file    Highest education level: Not on file   Occupational History    Not on file   Tobacco Use    Smoking status: Never Smoker    Smokeless tobacco: Never Used   Vaping Use    Vaping Use: Never used   Substance and Sexual Activity    Alcohol use: Yes     Comment: Socially     Drug use: No    Sexual activity: Yes     Partners: Female     Birth control/protection: None   Other Topics Concern    Not on file   Social History Narrative    ** Merged History Encounter **          Social Determinants of Health     Financial Resource Strain:     Difficulty of Paying Living Expenses: Not on file   Food Insecurity:     Worried About Running Out of Food in the Last Year: Not on file    Joy of Food in the Last Year: Not on file   Transportation Needs:     Lack of Transportation (Medical): Not on file    Lack of Transportation (Non-Medical):  Not on file   Physical Activity: Sufficiently Active    Days of Exercise per Week: 5 days    Minutes of Exercise per Session: 30 min   Stress:     Feeling of Stress : Not on file   Social Connections:     Frequency of Communication with Friends and Family: Not on file    Frequency of Social Gatherings with Friends and Family: Not on file    Attends Hindu Services: Not on file    Active Member of Clubs or Organizations: Not on file    Attends Club or Organization Meetings: Not on file    Marital Status: Not on file   Intimate Partner Violence: Not At Risk    Fear of Current or Ex-Partner: No    Emotionally Abused: No    Physically Abused: No    Sexually Abused: No   Housing Stability:     Unable to Pay for Housing in the Last Year: Not on file    Number of [place] in the Last Year: Not on file    Unstable Housing in the Last Year: Not on file       REVIEW OF SYSTEMS:    14 point review of systems on the intake form is negative except as noted in the HPI    PHYSICAL EXAMINATION:  Visit Vitals  Pulse 68   Temp 97.7 °F (36.5 °C)   Resp 15   Ht 5' 11\" (1.803 m)   Wt 213 lb (96.6 kg)   SpO2 97%   BMI 29.71 kg/m²     Body mass index is 29.71 kg/m². GENERAL: Alert and oriented x3, in no acute distress, well-developed, well-nourished. HEENT: Normocephalic, atraumatic. Elbow Exam   R   L  ROM Active      Flexion   Full   Full   Extension  Full   Full   Pronation  Full   Full   Supination  Full   Full  ROM  Passive   Flexion   Full   Full   Extension  Full   Full   Pronation  Full   Full   Supination  Full   Full  Tenderness to palpation   Medial Epicondyle -   -   Lateral Epicondyle +   -  Tinel Sign Cubital Tunnel -   -  Ulnar Nerve Subluxation -   -  Distal Biceps Abnormality -   -  Triceps Abnormality  -   -  Other tenderness  -   -  Other Deformity  -   -  Olecranon Bursitis  -   -  Warmth   -   -  Erythema   -   -  Additional Findings: Pain with resisted wrist and long finger extension on the right with the elbow extended. IMAGING:  No imaging today    ASSESSMENT & PLAN  Diagnosis: Right elbow lateral condyle-lalitha Ring has symptomatic right elbow lateral epicondylitis. We discussed the treatment options at length today. I recommend he continue with the counterforce bracing as well as the home exercise program.  In order to try to give him some relief of his symptoms today he opted for a corticosteroid injection. I would like to see him back as needed. Prescription medication management discussed.      [facility]  OFFICE PROCEDURE PROGRESS NOTE        Chart reviewed for the following:   I, Shira Mondragon MD, have reviewed the History, Physical and updated the Allergic reactions for Rosana Bitters     TIME OUT performed immediately prior to start of procedure:   Becky Menchaca MD, have performed the following reviews on Ayan Kang prior to the start of the procedure:            * Patient was identified by name and date of birth   * Agreement on procedure being performed was verified  * Risks and Benefits explained to the patient  * Procedure site verified and marked as necessary  * Patient was positioned for comfort  * Consent was signed and verified    Time: 4:12 PM    Location: Right elbow lateral epicondyle    Kenalog 40mg & 1cc Lidocaine    Date of procedure: 2/16/2022    Procedure performed by:  Agata Tomlin MD    Provider assisted by: Valentine Harrison LPN    Patient assisted by: self    How tolerated by patient: tolerated the procedure well with no complications    Post Procedural Pain Scale: 0 - No Hurt    Comments: none      Electronically signed by: Agata Tomlin MD    Note: This note was completed using voice recognition software.   Any typographical/name errors or mistakes are unintentional.

## 2022-03-25 DIAGNOSIS — Z00.00 ROUTINE PHYSICAL EXAMINATION: ICD-10-CM

## 2022-03-28 ENCOUNTER — HOSPITAL ENCOUNTER (OUTPATIENT)
Dept: LAB | Age: 52
Discharge: HOME | End: 2022-03-28

## 2022-03-28 LAB — SENTARA SPECIMEN COL,SENBCF: NORMAL

## 2022-03-28 PROCEDURE — 99001 SPECIMEN HANDLING PT-LAB: CPT

## 2022-03-29 LAB
A-G RATIO,AGRAT: 2.2 RATIO (ref 1.1–2.6)
ABSOLUTE LYMPHOCYTE COUNT, 10803: 1.6 K/UL (ref 1–4.8)
ALBUMIN SERPL-MCNC: 4.7 G/DL (ref 3.5–5)
ALP SERPL-CCNC: 66 U/L (ref 25–115)
ALT SERPL-CCNC: 21 U/L (ref 5–40)
ANION GAP SERPL CALC-SCNC: 11 MMOL/L (ref 3–15)
AST SERPL W P-5'-P-CCNC: 19 U/L (ref 10–37)
BASOPHILS # BLD: 0 K/UL (ref 0–0.2)
BASOPHILS NFR BLD: 1 % (ref 0–2)
BILIRUB SERPL-MCNC: 0.3 MG/DL (ref 0.2–1.2)
BILIRUB UR QL: NEGATIVE
BUN SERPL-MCNC: 20 MG/DL (ref 6–22)
CALCIUM SERPL-MCNC: 9 MG/DL (ref 8.4–10.5)
CHLORIDE SERPL-SCNC: 101 MMOL/L (ref 98–110)
CHOLEST SERPL-MCNC: 295 MG/DL (ref 110–200)
CLARITY: CLEAR
CO2 SERPL-SCNC: 28 MMOL/L (ref 20–32)
COLOR UR: YELLOW
CREAT SERPL-MCNC: 0.9 MG/DL (ref 0.5–1.2)
EOSINOPHIL # BLD: 0.1 K/UL (ref 0–0.5)
EOSINOPHIL NFR BLD: 1 % (ref 0–6)
ERYTHROCYTE [DISTWIDTH] IN BLOOD BY AUTOMATED COUNT: 14.1 % (ref 10–15.5)
GFRAA, 66117: >60
GFRNA, 66118: >60
GLOBULIN,GLOB: 2.1 G/DL (ref 2–4)
GLUCOSE SERPL-MCNC: 104 MG/DL (ref 70–99)
GLUCOSE UR QL: NEGATIVE MG/DL
GRANULOCYTES,GRANS: 54 % (ref 40–75)
HCT VFR BLD AUTO: 48.6 % (ref 39.3–51.6)
HDLC SERPL-MCNC: 43 MG/DL
HDLC SERPL-MCNC: 6.9 MG/DL (ref 0–5)
HGB BLD-MCNC: 14.9 G/DL (ref 13.1–17.2)
HGB UR QL STRIP: NEGATIVE
IMMATURE PLATELET FRACTION: 11.6 % (ref 1.1–7.1)
KETONES UR QL STRIP.AUTO: NEGATIVE MG/DL
LDL/HDL RATIO,LDHD: 5.1
LDLC SERPL CALC-MCNC: 218 MG/DL (ref 50–99)
LEUKOCYTE ESTERASE: NEGATIVE
LYMPHOCYTES, LYMLT: 35 % (ref 20–45)
MCH RBC QN AUTO: 30 PG (ref 26–34)
MCHC RBC AUTO-ENTMCNC: 31 G/DL (ref 31–36)
MCV RBC AUTO: 97 FL (ref 80–95)
MONOCYTES # BLD: 0.4 K/UL (ref 0.1–1)
MONOCYTES NFR BLD: 10 % (ref 3–12)
NEUTROPHILS # BLD AUTO: 2.4 K/UL (ref 1.8–7.7)
NITRITE UR QL STRIP.AUTO: NEGATIVE
NON-HDL CHOLESTEROL, 011976: 252 MG/DL
PH UR STRIP: 6 PH (ref 5–8)
PLATELET # BLD AUTO: 157 K/UL (ref 140–440)
PMV BLD AUTO: 13.2 FL (ref 9–13)
POTASSIUM SERPL-SCNC: 4.3 MMOL/L (ref 3.5–5.5)
PROT SERPL-MCNC: 6.8 G/DL (ref 6.4–8.3)
PROT UR QL STRIP: NEGATIVE MG/DL
PSA SERPL-MCNC: 2.13 NG/ML
RBC # BLD AUTO: 5 M/UL (ref 3.8–5.8)
RBC #/AREA URNS HPF: NEGATIVE /HPF
SODIUM SERPL-SCNC: 140 MMOL/L (ref 133–145)
SP GR UR: 1.02 (ref 1–1.03)
TRIGL SERPL-MCNC: 171 MG/DL (ref 40–149)
URINE ASCORBIC ACID: NEGATIVE MG/DL
UROBILINOGEN UR STRIP-MCNC: <2 MG/DL
VLDLC SERPL CALC-MCNC: 34 MG/DL (ref 8–30)
WBC # BLD AUTO: 4.5 K/UL (ref 4–11)
WBC URNS QL MICRO: NORMAL /HPF (ref 0–2)

## 2022-05-25 DIAGNOSIS — Z00.00 ROUTINE PHYSICAL EXAMINATION: ICD-10-CM

## 2022-05-25 DIAGNOSIS — E78.00 HYPERCHOLESTEROLEMIA: ICD-10-CM

## 2022-07-26 ENCOUNTER — PATIENT MESSAGE (OUTPATIENT)
Dept: INTERNAL MEDICINE CLINIC | Age: 52
End: 2022-07-26

## 2022-07-26 DIAGNOSIS — E78.2 MIXED HYPERLIPIDEMIA: Primary | ICD-10-CM

## 2022-07-26 NOTE — TELEPHONE ENCOUNTER
From: Mio Jeronimo  To: Pablo Khalil MD  Sent: 7/26/2022 10:00 AM EDT  Subject: Tyron Holcomb    Good morning. You asked me check back in a few months about possibly starting Livalo. I stopped the Pravastatin in April as you recommended and the muscle symptoms resolved after a few weeks.    Thanks

## 2022-09-08 ENCOUNTER — PATIENT MESSAGE (OUTPATIENT)
Dept: INTERNAL MEDICINE CLINIC | Age: 52
End: 2022-09-08

## 2022-09-08 RX ORDER — ESCITALOPRAM OXALATE 20 MG/1
20 TABLET ORAL DAILY
Qty: 90 TABLET | Refills: 3 | Status: SHIPPED | OUTPATIENT
Start: 2022-09-08

## 2022-09-08 NOTE — TELEPHONE ENCOUNTER
----- Message from Dorothea De Paz sent at 9/8/2022  1:06 PM EDT -----  Regarding: Refill  I was wondering if you would mind refilling my Escitalopram 20mg daily   I would prefer a 90 day supply.   [facility] [address] [facility]    Thank you  Rima Saucedo

## 2022-09-08 NOTE — TELEPHONE ENCOUNTER
Last Visit: 2/25/22  with MD Corbin Singh  Next Appointment: 2/28/23 with MD Corbin Singh    Requested Prescriptions     Pending Prescriptions Disp Refills    escitalopram oxalate (LEXAPRO) 20 mg tablet 90 Tablet 3     Sig: Take 1 Tablet by mouth daily. For [address] [facility] in place:   Recommendation Provided To:    Intervention Detail: New Rx: 1, reason: Patient Preference  Gap Closed?:   Intervention Accepted By:   Time Spent (min): 5

## 2022-10-05 ENCOUNTER — OFFICE VISIT (OUTPATIENT)
Dept: ORTHOPEDIC SURGERY | Age: 52
End: 2022-10-05
Payer: COMMERCIAL

## 2022-10-05 VITALS — HEIGHT: 71 IN | WEIGHT: 217.8 LBS | BODY MASS INDEX: 30.49 KG/M2

## 2022-10-05 DIAGNOSIS — S46.012A TRAUMATIC TEAR OF LEFT ROTATOR CUFF, UNSPECIFIED TEAR EXTENT, INITIAL ENCOUNTER: ICD-10-CM

## 2022-10-05 DIAGNOSIS — M25.512 CHRONIC LEFT SHOULDER PAIN: ICD-10-CM

## 2022-10-05 DIAGNOSIS — S46.102A INJURY OF TENDON OF LONG HEAD OF LEFT BICEPS, INITIAL ENCOUNTER: Primary | ICD-10-CM

## 2022-10-05 DIAGNOSIS — S46.102A INJURY OF TENDON OF LONG HEAD OF LEFT BICEPS, INITIAL ENCOUNTER: ICD-10-CM

## 2022-10-05 DIAGNOSIS — G89.29 CHRONIC LEFT SHOULDER PAIN: ICD-10-CM

## 2022-10-05 PROCEDURE — 20610 DRAIN/INJ JOINT/BURSA W/O US: CPT | Performed by: ORTHOPAEDIC SURGERY

## 2022-10-05 PROCEDURE — 99214 OFFICE O/P EST MOD 30 MIN: CPT | Performed by: ORTHOPAEDIC SURGERY

## 2022-10-05 PROCEDURE — 73030 X-RAY EXAM OF SHOULDER: CPT | Performed by: ORTHOPAEDIC SURGERY

## 2022-10-05 RX ORDER — TRIAMCINOLONE ACETONIDE 40 MG/ML
40 INJECTION, SUSPENSION INTRA-ARTICULAR; INTRAMUSCULAR ONCE
Status: COMPLETED | OUTPATIENT
Start: 2022-10-05 | End: 2022-10-05

## 2022-10-05 RX ADMIN — TRIAMCINOLONE ACETONIDE 40 MG: 40 INJECTION, SUSPENSION INTRA-ARTICULAR; INTRAMUSCULAR at 16:06
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
## 2022-11-03 DIAGNOSIS — E78.2 MIXED HYPERLIPIDEMIA: ICD-10-CM

## 2022-11-04 ENCOUNTER — APPOINTMENT (OUTPATIENT)
Dept: INTERNAL MEDICINE CLINIC | Age: 52
End: 2022-11-04

## 2022-11-04 DIAGNOSIS — E78.2 MIXED HYPERLIPIDEMIA: ICD-10-CM

## 2022-11-05 DIAGNOSIS — S46.012A TRAUMATIC TEAR OF LEFT ROTATOR CUFF, UNSPECIFIED TEAR EXTENT, INITIAL ENCOUNTER: ICD-10-CM

## 2022-11-05 DIAGNOSIS — S46.102A INJURY OF TENDON OF LONG HEAD OF LEFT BICEPS, INITIAL ENCOUNTER: ICD-10-CM

## 2022-11-05 LAB
CHOLEST SERPL-MCNC: 236 MG/DL (ref 110–200)
HDLC SERPL-MCNC: 4.7 MG/DL (ref 0–5)
HDLC SERPL-MCNC: 50 MG/DL
LDL/HDL RATIO,LDHD: 3.3
LDLC SERPL CALC-MCNC: 165 MG/DL (ref 50–99)
NON-HDL CHOLESTEROL, 011976: 186 MG/DL
TRIGL SERPL-MCNC: 104 MG/DL (ref 40–149)
VLDLC SERPL CALC-MCNC: 21 MG/DL (ref 8–30)

## 2022-11-06 ENCOUNTER — PATIENT MESSAGE (OUTPATIENT)
Dept: INTERNAL MEDICINE CLINIC | Age: 52
End: 2022-11-06

## 2022-11-06 DIAGNOSIS — E78.2 MIXED HYPERLIPIDEMIA: Primary | ICD-10-CM

## 2022-11-07 NOTE — TELEPHONE ENCOUNTER
From: Anshu Flores MD  To: Rhiannon Heath  Sent: 11/6/2022 1:01 PM EST  Subject: teodora    Hi, Song Raritan not know if you had a chance to look at them yet but your labs look significantly better: your HDL cholesterol went up from 43 to 50 which is the desired level to be cardioprotective in men. Your triglycerides came down, total cholesterol came down and the LDL cholesterol decreased also. Your calculated [place] risk of having a heart attack before you turn 61 decreased from about 10% to 4.8%. To give you an idea, the average MI risk for men your age in this country is 10%. If you are okay with it, I would just continue the current medication at the current dose. If you feel strongly that you would like to increase to the 2 mg dose, just let me know. I will put in some lab orders for you to have shortly done before your physical with me in February, where would you like to have those done?     Hope you are doing well    [facility]

## 2023-02-01 DIAGNOSIS — E78.2 MIXED HYPERLIPIDEMIA: ICD-10-CM

## 2023-02-07 DIAGNOSIS — E78.2 MIXED HYPERLIPIDEMIA: ICD-10-CM

## 2023-02-21 LAB
A/G RATIO: 1.6 RATIO (ref 1.1–2.6)
ALBUMIN SERPL-MCNC: 4.1 G/DL (ref 3.5–5)
ALP BLD-CCNC: 62 U/L (ref 25–115)
ALT SERPL-CCNC: 22 U/L (ref 5–40)
AST SERPL-CCNC: 26 U/L (ref 10–37)
BILIRUB SERPL-MCNC: 0.3 MG/DL (ref 0.2–1.2)
BILIRUBIN DIRECT: <0.2 MG/DL (ref 0–0.3)
CHOLESTEROL/HDL RATIO: 5.3 (ref 0–5)
CHOLESTEROL: 232 MG/DL (ref 110–200)
GLOBULIN: 2.5 G/DL (ref 2–4)
HDLC SERPL-MCNC: 44 MG/DL
LDL CHOLESTEROL CALCULATED: 165 MG/DL (ref 50–99)
LDL/HDL RATIO: 3.8
NON-HDL CHOLESTEROL: 188 MG/DL
TOTAL CK: 261 U/L (ref 30–200)
TOTAL PROTEIN: 6.6 G/DL (ref 6.4–8.3)
TRIGL SERPL-MCNC: 113 MG/DL (ref 40–149)
VLDLC SERPL CALC-MCNC: 23 MG/DL (ref 8–30)

## 2023-02-28 ENCOUNTER — OFFICE VISIT (OUTPATIENT)
Age: 53
End: 2023-02-28
Payer: COMMERCIAL

## 2023-02-28 VITALS
RESPIRATION RATE: 18 BRPM | HEART RATE: 73 BPM | BODY MASS INDEX: 29.12 KG/M2 | HEIGHT: 71 IN | TEMPERATURE: 97.8 F | SYSTOLIC BLOOD PRESSURE: 112 MMHG | WEIGHT: 208 LBS | DIASTOLIC BLOOD PRESSURE: 69 MMHG | OXYGEN SATURATION: 98 %

## 2023-02-28 DIAGNOSIS — Z00.00 ROUTINE PHYSICAL EXAMINATION: Primary | ICD-10-CM

## 2023-02-28 DIAGNOSIS — E78.5 DYSLIPIDEMIA: ICD-10-CM

## 2023-02-28 PROCEDURE — 99396 PREV VISIT EST AGE 40-64: CPT | Performed by: INTERNAL MEDICINE

## 2023-02-28 ASSESSMENT — ENCOUNTER SYMPTOMS
BLOOD IN STOOL: 0
SHORTNESS OF BREATH: 0

## 2023-02-28 ASSESSMENT — PATIENT HEALTH QUESTIONNAIRE - PHQ9
SUM OF ALL RESPONSES TO PHQ QUESTIONS 1-9: 0
SUM OF ALL RESPONSES TO PHQ9 QUESTIONS 1 & 2: 0
SUM OF ALL RESPONSES TO PHQ QUESTIONS 1-9: 0
1. LITTLE INTEREST OR PLEASURE IN DOING THINGS: 0
SUM OF ALL RESPONSES TO PHQ QUESTIONS 1-9: 0
2. FEELING DOWN, DEPRESSED OR HOPELESS: 0
SUM OF ALL RESPONSES TO PHQ QUESTIONS 1-9: 0

## 2023-02-28 NOTE — PROGRESS NOTES
HPI     Wilmer Marlow is here for routine physical exam.  We recently communicated about his lipids, which are significantly improved on the Livalo compared to a year ago. He does, however, have myalgias that limit his ability to exercise. He is taking co-Q10. Not sure if he has ever tried Zetia. Past Medical History:   Diagnosis Date    Depression     Dyslipidemia     History of echocardiogram 8/17/04    Normal. EF 60-65%. Normal cardiac stress test 8/17/04    Maximal stress negative for ischemia. Ex time: 14 mins    Rhabdomyolysis     recurrent, idiopathic    Sleep apnea     Sterilization         Past Surgical History:   Procedure Laterality Date    ANESTH,SURGERY OF SHOULDER Right 2018    rotator cuff repair    VASECTOMY  1/30/2015        Current Outpatient Medications   Medication Sig Dispense Refill    EPINEPHrine (ADRENACLICK) 5.48 EX/3.88YT SOAJ injection Inject 0.15 mg into the muscle once as needed      escitalopram (LEXAPRO) 20 MG tablet Take 20 mg by mouth daily      ibuprofen (ADVIL;MOTRIN) 200 MG tablet Take 200 mg by mouth every 6 hours as needed      pitavastatin (LIVALO) 1 MG TABS tablet Take 1 mg by mouth daily       No current facility-administered medications for this visit.         Allergies   Allergen Reactions    Crab Extract Allergy Skin Test Hives    Simvastatin Other (See Comments)     rhabdomyolysis        Social History     Socioeconomic History    Marital status:      Spouse name: Not on file    Number of children: Not on file    Years of education: Not on file    Highest education level: Not on file   Occupational History    Not on file   Tobacco Use    Smoking status: Never    Smokeless tobacco: Never   Substance and Sexual Activity    Alcohol use: Yes    Drug use: No    Sexual activity: Not on file   Other Topics Concern    Not on file   Social History Narrative         ** Merged History Encounter **     Social Determinants of Health     Financial Resource Strain: Not on file   Food Insecurity: Not on file   Transportation Needs: Not on file   Physical Activity: Not on file   Stress: Not on file   Social Connections: Not on file   Intimate Partner Violence: Not on file   Housing Stability: Not on file        Family History   Problem Relation Age of Onset    Depression Daughter     High Cholesterol Father     Coronary Art Dis Other         rheumatic heart disease    Obesity Mother     Depression Mother            /69   Pulse 73   Temp 97.8 °F (36.6 °C) (Temporal)   Resp 18   Ht 5' 11\" (1.803 m)   Wt 208 lb (94.3 kg)   SpO2 98%   BMI 29.01 kg/m²      Review of Systems   Constitutional:         No night sweats. Respiratory:  Negative for shortness of breath. Cardiovascular:  Negative for chest pain. No orthopnea, no PND   Gastrointestinal:  Negative for blood in stool. Genitourinary:  Negative for hematuria. Musculoskeletal:  Positive for myalgias. Psychiatric/Behavioral:  Negative for dysphoric mood. The patient is not nervous/anxious. Physical Exam  Constitutional:       General: He is not in acute distress. Comments: Weight stable   HENT:      Right Ear: Tympanic membrane, ear canal and external ear normal.      Left Ear: Tympanic membrane, ear canal and external ear normal.   Eyes:      General: No scleral icterus. Conjunctiva/sclera: Conjunctivae normal.   Neck:      Comments: No cervical or supraclavicular lymphadenopathy. Cardiovascular:      Rate and Rhythm: Normal rate. Pulses: Normal pulses. Heart sounds: No murmur heard. No friction rub. No gallop. Pulmonary:      Effort: Pulmonary effort is normal.      Breath sounds: Normal breath sounds. Abdominal:      General: Abdomen is flat. Palpations: There is no mass. Tenderness: There is no abdominal tenderness. Genitourinary:     Prostate: Normal.   Musculoskeletal:      Comments: No clubbing, cyanosis, or edema.   Calves NT, no cords Skin:     General: Skin is warm and dry. Neurological:      General: No focal deficit present. Mental Status: He is alert and oriented to person, place, and time. Psychiatric:         Mood and Affect: Mood normal.                Desean Coughlin was seen today for annual exam.    Diagnoses and all orders for this visit:    Routine physical examination  Comments:  Normal blood pressure, normal CATERINA. PSA with next labs. Orders:  -     Comprehensive Metabolic Panel; Future  -     CBC with Auto Differential; Future  -     Urinalysis with Microscopic; Future  -     PSA Screening; Future    Dyslipidemia  Comments:  W/myalgias, even on Livalo and co-Q10. So severe in past that was wheelchair-bound for a while. Stop statin, labs 3 months, continue weight loss and exercise  Orders:  -     Lipid Panel; Future         No follow-up provider specified.          Coy Patel MD

## 2023-02-28 NOTE — PROGRESS NOTES
Shanna Abebe presents today for No chief complaint on file. 1. \"Have you been to the ER, urgent care clinic since your last visit? Hospitalized since your last visit? \" no    2. \"Have you seen or consulted any other health care providers outside of the [address] [place] [address] since your last visit? \" no     3. For patients aged 39-70: Has the patient had a colonoscopy / FIT/ Cologuard? Yes - no Care Gap present      If the patient is female:    4. For patients aged 41-77: Has the patient had a mammogram within the past 2 years? NA - based on age or sex      11. For patients aged 21-65: Has the patient had a pap smear?  NA - based on age or sex

## 2023-05-17 ENCOUNTER — OFFICE VISIT (OUTPATIENT)
Age: 53
End: 2023-05-17

## 2023-05-17 VITALS — HEIGHT: 71 IN | BODY MASS INDEX: 29.01 KG/M2 | RESPIRATION RATE: 18 BRPM

## 2023-05-17 DIAGNOSIS — M75.102 ROTATOR CUFF SYNDROME OF LEFT SHOULDER: Primary | ICD-10-CM

## 2023-05-17 RX ORDER — TRIAMCINOLONE ACETONIDE 40 MG/ML
40 INJECTION, SUSPENSION INTRA-ARTICULAR; INTRAMUSCULAR ONCE
Status: COMPLETED | OUTPATIENT
Start: 2023-05-17 | End: 2023-05-17

## 2023-05-17 RX ADMIN — TRIAMCINOLONE ACETONIDE 40 MG: 40 INJECTION, SUSPENSION INTRA-ARTICULAR; INTRAMUSCULAR at 15:13
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
## 2023-06-21 DIAGNOSIS — S46.012A TRAUMATIC TEAR OF LEFT ROTATOR CUFF, UNSPECIFIED TEAR EXTENT, INITIAL ENCOUNTER: Primary | ICD-10-CM

## 2023-06-23 LAB
BASOPHILS # BLD AUTO: 0 X10E3/UL (ref 0–0.2)
BASOPHILS NFR BLD AUTO: 1 %
EOSINOPHIL # BLD AUTO: 0.1 X10E3/UL (ref 0–0.4)
EOSINOPHIL NFR BLD AUTO: 1 %
ERYTHROCYTE [DISTWIDTH] IN BLOOD BY AUTOMATED COUNT: 13.1 % (ref 11.6–15.4)
HCT VFR BLD AUTO: 45.4 % (ref 37.5–51)
HGB BLD-MCNC: 15.6 G/DL (ref 13–17.7)
IMM GRANULOCYTES # BLD AUTO: 0 X10E3/UL (ref 0–0.1)
IMM GRANULOCYTES NFR BLD AUTO: 0 %
LYMPHOCYTES # BLD AUTO: 1.4 X10E3/UL (ref 0.7–3.1)
LYMPHOCYTES NFR BLD AUTO: 34 %
MCH RBC QN AUTO: 30.6 PG (ref 26.6–33)
MCHC RBC AUTO-ENTMCNC: 34.4 G/DL (ref 31.5–35.7)
MCV RBC AUTO: 89 FL (ref 79–97)
MONOCYTES # BLD AUTO: 0.4 X10E3/UL (ref 0.1–0.9)
MONOCYTES NFR BLD AUTO: 9 %
NEUTROPHILS # BLD AUTO: 2.3 X10E3/UL (ref 1.4–7)
NEUTROPHILS NFR BLD AUTO: 55 %
PLATELET # BLD AUTO: 176 X10E3/UL (ref 150–450)
RBC # BLD AUTO: 5.1 X10E6/UL (ref 4.14–5.8)
WBC # BLD AUTO: 4.2 X10E3/UL (ref 3.4–10.8)

## 2023-06-24 LAB
ALBUMIN SERPL-MCNC: 4.7 G/DL (ref 3.8–4.9)
ALBUMIN/GLOB SERPL: 1.9 {RATIO} (ref 1.2–2.2)
ALP SERPL-CCNC: 74 IU/L (ref 44–121)
ALT SERPL-CCNC: 26 IU/L (ref 0–44)
APPEARANCE UR: CLEAR
AST SERPL-CCNC: 22 IU/L (ref 0–40)
BACTERIA #/AREA URNS HPF: NORMAL /[HPF]
BILIRUB SERPL-MCNC: 0.3 MG/DL (ref 0–1.2)
BILIRUB UR QL STRIP: NEGATIVE
BUN SERPL-MCNC: 15 MG/DL (ref 6–24)
BUN/CREAT SERPL: 16 (ref 9–20)
CALCIUM SERPL-MCNC: 9.5 MG/DL (ref 8.7–10.2)
CASTS URNS QL MICRO: NORMAL /LPF
CHLORIDE SERPL-SCNC: 100 MMOL/L (ref 96–106)
CHOLEST SERPL-MCNC: 294 MG/DL (ref 100–199)
CO2 SERPL-SCNC: 28 MMOL/L (ref 20–29)
COLOR UR: YELLOW
CREAT SERPL-MCNC: 0.95 MG/DL (ref 0.76–1.27)
EGFRCR SERPLBLD CKD-EPI 2021: 96 ML/MIN/1.73
EPI CELLS #/AREA URNS HPF: NORMAL /HPF (ref 0–10)
GLOBULIN SER CALC-MCNC: 2.5 G/DL (ref 1.5–4.5)
GLUCOSE SERPL-MCNC: 100 MG/DL (ref 70–99)
GLUCOSE UR QL STRIP: NEGATIVE
HDLC SERPL-MCNC: 48 MG/DL
HGB UR QL STRIP: NEGATIVE
KETONES UR QL STRIP: NEGATIVE
LABORATORY COMMENT REPORT: ABNORMAL
LDLC SERPL CALC-MCNC: 232 MG/DL (ref 0–99)
LEUKOCYTE ESTERASE UR QL STRIP: NEGATIVE
MICRO URNS: NORMAL
MICRO URNS: NORMAL
NITRITE UR QL STRIP: NEGATIVE
PH UR STRIP: 6 [PH] (ref 5–7.5)
POTASSIUM SERPL-SCNC: 4.7 MMOL/L (ref 3.5–5.2)
PROT SERPL-MCNC: 7.2 G/DL (ref 6–8.5)
PROT UR QL STRIP: NEGATIVE
PSA SERPL-MCNC: 3 NG/ML (ref 0–4)
RBC #/AREA URNS HPF: NORMAL /HPF (ref 0–2)
SODIUM SERPL-SCNC: 143 MMOL/L (ref 134–144)
SP GR UR STRIP: 1.02 (ref 1–1.03)
TRIGL SERPL-MCNC: 83 MG/DL (ref 0–149)
UROBILINOGEN UR STRIP-MCNC: 0.2 MG/DL (ref 0.2–1)
VLDLC SERPL CALC-MCNC: 14 MG/DL (ref 5–40)
WBC #/AREA URNS HPF: NORMAL /HPF (ref 0–5)

## 2023-06-27 ENCOUNTER — HOSPITAL ENCOUNTER (OUTPATIENT)
Facility: HOSPITAL | Age: 53
Discharge: HOME | End: 2023-06-30
Attending: ORTHOPAEDIC SURGERY
Payer: COMMERCIAL

## 2023-06-27 DIAGNOSIS — S46.012A TRAUMATIC TEAR OF LEFT ROTATOR CUFF, UNSPECIFIED TEAR EXTENT, INITIAL ENCOUNTER: ICD-10-CM

## 2023-06-27 PROCEDURE — 73221 MRI JOINT UPR EXTREM W/O DYE: CPT

## 2023-07-25 ENCOUNTER — OFFICE VISIT (OUTPATIENT)
Age: 53
End: 2023-07-25

## 2023-07-25 VITALS
RESPIRATION RATE: 18 BRPM | SYSTOLIC BLOOD PRESSURE: 141 MMHG | HEIGHT: 71 IN | BODY MASS INDEX: 29.01 KG/M2 | DIASTOLIC BLOOD PRESSURE: 91 MMHG

## 2023-07-25 DIAGNOSIS — S46.012A TRAUMATIC COMPLETE TEAR OF LEFT ROTATOR CUFF, INITIAL ENCOUNTER: Primary | ICD-10-CM

## 2023-07-25 DIAGNOSIS — S43.432A SUPERIOR GLENOID LABRUM LESION OF LEFT SHOULDER, INITIAL ENCOUNTER: ICD-10-CM

## 2023-07-25 PROCEDURE — 99024 POSTOP FOLLOW-UP VISIT: CPT | Performed by: ORTHOPAEDIC SURGERY

## 2023-07-25 RX ORDER — OXYCODONE HYDROCHLORIDE AND ACETAMINOPHEN 5; 325 MG/1; MG/1
2 TABLET ORAL EVERY 4 HOURS PRN
Qty: 35 TABLET | Refills: 0 | Status: SHIPPED | OUTPATIENT
Start: 2023-07-25 | End: 2023-08-01

## 2023-07-25 NOTE — PROGRESS NOTES
preoperative appointment. All of his questions were answered. We discussed the surgery in detail. Informed consent obtained. Pain medication was prescribed. Follow-up postoperatively. Prescription medication management discussed. Plan was discussed in detail with patient, all questions were answered, and patient voiced understanding of plan. Electronically signed by: Regino Knutson MD    Note: This note was completed using voice recognition software.   Any typographical/name errors or mistakes are unintentional.

## 2023-08-15 ENCOUNTER — OFFICE VISIT (OUTPATIENT)
Age: 53
End: 2023-08-15

## 2023-08-15 DIAGNOSIS — S43.432D SUPERIOR GLENOID LABRUM LESION OF LEFT SHOULDER, SUBSEQUENT ENCOUNTER: ICD-10-CM

## 2023-08-15 DIAGNOSIS — S46.012D TRAUMATIC COMPLETE TEAR OF LEFT ROTATOR CUFF, SUBSEQUENT ENCOUNTER: Primary | ICD-10-CM

## 2023-08-15 PROCEDURE — 99024 POSTOP FOLLOW-UP VISIT: CPT | Performed by: ORTHOPAEDIC SURGERY

## 2023-08-15 NOTE — PROGRESS NOTES
[facility] AMBULATORY OFFICE NOTE    Patient: Rios Sanchez                MRN: [id number]       SSN: [id number]  YOB: 1970        AGE: 47 y.o. SEX: male  There is no height or weight on file to calculate BMI. PCP: Gavin Lujan MD  08/15/23    Chief Complaint: Left shoulder follow-up    HPI: Rios Sanchez is a 47 y.o. male patient who returns to the office today for his first postoperative appointment for his recent left shoulder surgery. He had a massive rotator cuff tear and biceps tenodesis 2 weeks ago. He has been in a sling. Past Medical History:   Diagnosis Date    Depression     Dyslipidemia     History of echocardiogram 8/17/04    Normal. EF 60-65%. Normal cardiac stress test 8/17/04    Maximal stress negative for ischemia. Ex time: 14 mins    Rhabdomyolysis     recurrent, idiopathic    Sleep apnea     Sterilization        Family History   Problem Relation Age of Onset    Depression Daughter     High Cholesterol Father     Coronary Art Dis Other         rheumatic heart disease    Obesity Mother     Depression Mother        Current Outpatient Medications   Medication Sig Dispense Refill    EPINEPHrine (ADRENACLICK) 7.12 YR/5.09KP SOAJ injection Inject 0.15 mg into the muscle once as needed      escitalopram (LEXAPRO) 20 MG tablet Take 20 mg by mouth daily      ibuprofen (ADVIL;MOTRIN) 200 MG tablet Take 200 mg by mouth every 6 hours as needed       No current facility-administered medications for this visit.        Allergies   Allergen Reactions    Crab Extract Allergy Skin Test Hives    Simvastatin Other (See Comments)     rhabdomyolysis       Past Surgical History:   Procedure Laterality Date    ANESTH,SURGERY OF SHOULDER Right 2018    rotator cuff repair    VASECTOMY  1/30/2015       Social History     Socioeconomic History    Marital status:      Spouse name: Not on file    Number of children: Not on file    Years of

## 2023-09-12 ENCOUNTER — OFFICE VISIT (OUTPATIENT)
Age: 53
End: 2023-09-12

## 2023-09-12 DIAGNOSIS — S46.012D TRAUMATIC COMPLETE TEAR OF LEFT ROTATOR CUFF, SUBSEQUENT ENCOUNTER: Primary | ICD-10-CM

## 2023-09-12 PROCEDURE — 99024 POSTOP FOLLOW-UP VISIT: CPT | Performed by: ORTHOPAEDIC SURGERY

## 2023-09-12 NOTE — PROGRESS NOTES
[facility] AMBULATORY OFFICE NOTE    Patient: Christiano Guerrero                MRN: [id number]       SSN: [id number]  YOB: 1970        AGE: 47 y.o. SEX: male  There is no height or weight on file to calculate BMI. PCP: Cathryn Brasher MD  09/12/23    Chief Complaint: Left shoulder follow-up    HPI: Christiano Guerrero is a 47 y.o. male patient who returns to the office today for her left shoulder. He is now 6 weeks out from his rotator cuff massive repair and biceps tenodesis. He has been in a sling. Past Medical History:   Diagnosis Date    Depression     Dyslipidemia     History of echocardiogram 8/17/04    Normal. EF 60-65%. Normal cardiac stress test 8/17/04    Maximal stress negative for ischemia. Ex time: 14 mins    Rhabdomyolysis     recurrent, idiopathic    Sleep apnea     Sterilization        Family History   Problem Relation Age of Onset    Depression Daughter     High Cholesterol Father     Coronary Art Dis Other         rheumatic heart disease    Obesity Mother     Depression Mother        Current Outpatient Medications   Medication Sig Dispense Refill    EPINEPHrine (ADRENACLICK) 9.42 WF/3.48SG SOAJ injection Inject 0.15 mg into the muscle once as needed      escitalopram (LEXAPRO) 20 MG tablet Take 20 mg by mouth daily      ibuprofen (ADVIL;MOTRIN) 200 MG tablet Take 200 mg by mouth every 6 hours as needed       No current facility-administered medications for this visit.        Allergies   Allergen Reactions    Crab Extract Allergy Skin Test Hives    Simvastatin Other (See Comments)     rhabdomyolysis       Past Surgical History:   Procedure Laterality Date    ANESTH,SURGERY OF SHOULDER Right 2018    rotator cuff repair    VASECTOMY  1/30/2015       Social History     Socioeconomic History    Marital status:      Spouse name: Not on file    Number of children: Not on file    Years of education: Not on file    Highest education

## 2023-09-25 ENCOUNTER — HOSPITAL ENCOUNTER (OUTPATIENT)
Facility: HOSPITAL | Age: 53
Setting detail: RECURRING SERIES
Discharge: HOME | End: 2023-09-28
Payer: COMMERCIAL

## 2023-09-25 PROCEDURE — 97162 PT EVAL MOD COMPLEX 30 MIN: CPT

## 2023-09-25 NOTE — PROGRESS NOTES
[facility] PHYSICAL THERAPY  [facility] [address] Phone: [phone] Fax [phone] of Care / Statement of Necessity for Physical Therapy Services     Patient Name: Bruce Swanson : 1970   Medical   Diagnosis: Left shoulder pain [M25.512] Treatment Diagnosis: M25.512  LEFT SHOULDER PAIN      Onset Date: 2023 (surgical date)  Payor :  Payor: Cordelia Lee / Plan: OPTIMA PPO / Product Type: *No Product type* /    Referral Source: Aleida Hernandez MD [facility]): 2023   Prior Hospitalization: See medical history Provider #: [id number]   Prior Level of Function: Kayaking, biking, house renovations, works as eye surgeon    Comorbidities: Sleep apnea, hx of R shoulder surgery, hx of rhabdomyolysis due to statin reaction, high cholesterol      Assessment / key information: Patient is a pleasant 47year old male presenting to PT with functional deficits and pain status post left RTC repair and biceps tenodesis. Subjective deficits include dressing, bathing, performing work tasks as eye surgeon, lifting, reaching, and carrying. PT assessment reveals decreased shoulder AROM/PROM, flexibility deficits, decreased strength, postural abnormalities, tenderness of GHJ musculature, & abnormal compensatory movement patterns. Patient would benefit from skilled PT to address noted impairments and return to PLOF safely and appropriately per post op protocol. Evaluation Complexity:  History:  MEDIUM  Complexity : 1-2 comorbidities / personal factors will impact the outcome/ POC ; Examination:  HIGH Complexity : 4+ Standardized tests and measures addressing body structure, function, activity limitation and / or participation in recreation  ;Presentation:  MEDIUM Complexity : Evolving with changing characteristics  ; Clinical Decision Making:  MEDIUM Complexity : FOTO score of 26-74 FOTO score = an established functional score where 100 = no

## 2023-09-25 NOTE — PROGRESS NOTES
PT DAILY TREATMENT NOTE/SHOULDER EVAL     Patient Name: Humberto Meadows    Date: 2023    : 1970  Insurance: Payor: Abdon Diallo / Plan: OPTIMA PPO / Product Type: *No Product type* /      Patient  verified yes     Visit #   Current / Total 1 10   Time   In / Out 11:48 AM 12:29 PM   Pain   In / Out 2/10 2/10   Subjective Functional Status/Changes: \"I had shoulder surgery. \"        Treatment Area: Left shoulder pain [M25.512]    SUBJECTIVE  Mechanism of Injury: Patient presents to PT s/p L shoulder RTC repair and biceps tenodesis. 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 Up to 5-6/10  -Was told the could remove sling. Able to use arm by side. Denies N/T down arm. Was white water rafting and tore his RTC which lead to injection     Pain Location (primary): Left Shoulder   Pain Pattern: []constant []intermittent []improving []worsening []no change since onset  Pain Description: [x]Sharp []Dull []Achy []Stiff []Pinching [x]Catching []Other:    OBJECTIVE/EXAMINATION    Patient declined modalities at end of session (need to return to work). Encouraged to utilize ice as needed for pain management. 10 min [x]Eval                  []Re-Eval       Therapeutic Procedures:   Tx Min  31 Billable or 1:1 Min (if diff from Tx Min) Procedure, Rationale, Specifics   10 NB no tx at eval 91782 Manual Therapy (timed):

## 2023-10-05 ENCOUNTER — HOSPITAL ENCOUNTER (OUTPATIENT)
Facility: HOSPITAL | Age: 53
Setting detail: RECURRING SERIES
Discharge: HOME | End: 2023-10-08
Payer: COMMERCIAL

## 2023-10-05 PROCEDURE — 97140 MANUAL THERAPY 1/> REGIONS: CPT

## 2023-10-05 PROCEDURE — 97110 THERAPEUTIC EXERCISES: CPT

## 2023-10-05 NOTE — PROGRESS NOTES
PHYSICAL / OCCUPATIONAL THERAPY - DAILY TREATMENT NOTE (updated )    Patient Name: Humberto Meadows    Date: 10/5/2023    : 1970  Insurance: Payor: Abdon Diallo / Plan: OPTIMA PPO / Product Type: *No Product type* /      Patient  verified Yes     Visit #   Current / Total 2 10   Time   In / Out 7:00 am 7:46 am   Pain   In / Out 1-2/10 010   Subjective Functional Status/Changes: Patient notes performing the exercises regularly without issue. TREATMENT AREA =  Left shoulder pain [M25.512]    OBJECTIVE  Modalities Rationale:     decrease edema, decrease inflammation, and decrease pain to improve patient's ability to progress to PLOF and address remaining functional goals. min [] Estim Unattended, type/location:                                      []  w/ice    []  w/heat    min [] Estim Attended, type/location:                                     []  w/US     []  w/ice    []  w/heat    []  TENS insruct      min []  Mechanical Traction: type/lbs                   []  pro   []  sup   []  int   []  cont    []  before manual    []  after manual    min []  Ultrasound, settings/location:     PD min  unbill []  Ice     []  Heat    location/position:     min []  Paraffin,  details:     min []  Vasopneumatic Device, press/temp:     min []  America Bray / Kieran Cutler: If using vaso (only need to measure limb vaso being performed on)      pre-treatment girth:       post-treatment girth:       measured at (landmark location):      min []  Other:        Therapeutic Procedures: Tx Min Billable or 1:1 Min (if diff from Tx Min) Procedure, Rationale, Specifics   31  92190 Therapeutic Exercise (timed):  increase ROM, strength, coordination, balance, and proprioception to improve patient's ability to progress to PLOF and address remaining functional goals.  (see flow sheet as applicable)     Details if applicable:       15  48160 Manual Therapy (timed):  decrease pain, increase ROM, and increase tissue extensibility to

## 2023-10-10 ENCOUNTER — APPOINTMENT (OUTPATIENT)
Facility: HOSPITAL | Age: 53
End: 2023-10-10
Payer: COMMERCIAL

## 2023-10-12 ENCOUNTER — HOSPITAL ENCOUNTER (OUTPATIENT)
Facility: HOSPITAL | Age: 53
Setting detail: RECURRING SERIES
Discharge: HOME | End: 2023-10-15
Payer: COMMERCIAL

## 2023-10-12 PROCEDURE — 97110 THERAPEUTIC EXERCISES: CPT

## 2023-10-12 PROCEDURE — 97140 MANUAL THERAPY 1/> REGIONS: CPT

## 2023-10-12 RX ORDER — ESCITALOPRAM OXALATE 20 MG/1
20 TABLET ORAL DAILY
Qty: 90 TABLET | Refills: 3 | Status: SHIPPED | OUTPATIENT
Start: 2023-10-12

## 2023-10-12 NOTE — PROGRESS NOTES
abnormalities, and instruct in home and community integration to address functional deficits and attain remaining goals. Progress toward goals / Updated goals:  []  See Progress Note/Recertification  Short Term Goals: To be accomplished in 2-4 weeks  Patient will be independent with prescribed HEP           Status at IE : initiated  Current: goal met (10/5/23)  Patient will verbalize understanding and consistent compliance with post op protocol/restrictions  Status at IE: reviewed   Patient to improve postural awareness as noted by ability to obtain with minimal VC from therapist  Status at IE:  visual and verbal cuing provided   Current: mild FHRS (10/23/23)  Patient to restore shoulder PROM to WNL  Status at IE: limited and guarding      Long Term Goals:  To be accomplished in 6-12 weeks  Patient to restore shoulder AROM to WNL and pain free   Status at IE: limited and painful   Patient will be able to don/doff shirt and perform bathing ADLs without pain/difficulty  Status at IE: compensatory patterns, painful   Patient to restore functional reach and be able to manage getting plate/cup in and out of overhead cupboard to restore independence   Status at IE: unable   Patient will return to biking without increased pain   Status at IE: unable   Patient to restore UE strength to at least 4+/5 in all appropriate muscle groups for restoration of functional tasks  Status at IE: <4+/5     Next PN/RC due 10/24/23  Auth due after 15 visits    PLAN  - Continue Plan of [address] [place] [address]    10/12/2023      Future Appointments   Date Time Provider [address] [place] [address]   10/17/2023  5:40 PM Nany Covington, PT [facility]   10/23/2023  7:00 AM Stormy Montalvo, PT [facility]   10/26/2023  7:00 AM Karl Jenkins PTA [facility]   10/31/2023  7:00 AM Karl Jenkins PTA [facility]   11/1/2023  3:45 PM Brown Terry MD [facility] BS AMB   2/29/2024  3:40 PM Dixie Cormier MD [facility] BS AMB

## 2023-10-12 NOTE — TELEPHONE ENCOUNTER
----- Message from Susan Yanes sent at 10/12/2023 11:41 AM EDT -----  Regarding: Refill request  Contact: [phone]  Good morning. I need a refill on my Escitalopram 20mg please.   90 day supply preferrred  [facility]    Thank you

## 2023-10-17 ENCOUNTER — HOSPITAL ENCOUNTER (OUTPATIENT)
Facility: HOSPITAL | Age: 53
Setting detail: RECURRING SERIES
Discharge: HOME | End: 2023-10-20
Payer: COMMERCIAL

## 2023-10-17 PROCEDURE — 97140 MANUAL THERAPY 1/> REGIONS: CPT

## 2023-10-17 PROCEDURE — 97110 THERAPEUTIC EXERCISES: CPT

## 2023-10-17 NOTE — PROGRESS NOTES
PHYSICAL / OCCUPATIONAL THERAPY - DAILY TREATMENT NOTE (updated )    Patient Name: Prateek Lopez    Date: 10/17/2023    : 1970  Insurance: Payor: Danielle Almendarez / Plan: OPTIMA PPO / Product Type: *No Product type* /      Patient  verified Yes     Visit #   Current / Total 4 10   Time   In / Out 5:41 PM 6:24 PM   Pain   In / Out 2 2   Subjective Functional Status/Changes: \"I have  been doing too much because my wife got injured and had surgery so she is non weight bearing for 12 weeks. I had to carry her up the stairs. I haven't been very diligent with exercises at home but I am trying. \"      TREATMENT AREA =  Left shoulder pain [M25.512]    OBJECTIVE  Patient declined modalities. Encouraged use at home to reduce pain and inflammation. Therapeutic Procedures: Tx Min  43 Billable or 1:1 Min (if diff from Tx Min) Procedure, Rationale, Specifics   30 30 28369 Therapeutic Exercise (timed):  increase ROM, strength, coordination, balance, and proprioception to improve patient's ability to progress to PLOF and address remaining functional goals. (see flow sheet as applicable)     Details if applicable:  AAROM, stretches, pendulums, shoulder rolls, strengthening interventions     13 21 72507 Manual Therapy (timed):  decrease pain, increase ROM, and increase tissue extensibility to improve patient's ability to progress to PLOF and address remaining functional goals. The manual therapy interventions were performed at a separate and distinct time from the therapeutic activities interventions .  (see flow sheet as applicable)     Details if applicable:  PROM all directions; GHJ distraction; STM to biceps, deltoid, RTC    43 37 [facility] Totals Reminder: bill using total billable min of TIMED therapeutic procedures (example: do not include dry needle or estim unattended, both untimed codes, in totals to left)  8-22 min = 1 unit; 23-37 min = 2 units; 38-52 min = 3 units; 53-67 min = 4 units; 68-82 min = 5 units

## 2023-10-19 PROCEDURE — 97016 VASOPNEUMATIC DEVICE THERAPY: CPT

## 2023-10-19 NOTE — PROGRESS NOTES
PHYSICAL / OCCUPATIONAL THERAPY - DAILY TREATMENT NOTE (updated )    Patient Name: Michelle Mccall    Date: 10/19/2023    : 1970  Insurance: Payor: Abigail Sachin / Plan: OPTIMA PPO / Product Type: *No Product type* /      Patient  verified Yes     Visit #   Current / Total 5 10   Time   In / Out 706a 753 (47)   Pain   In / Out 1 2   Subjective Functional Status/Changes: Patient reports some soreness upon waking but better now      TREATMENT AREA =  Left shoulder pain [M25.512]    OBJECTIVE  Patient declined modalities          Therapeutic Procedures: Tx Min   Procedure, Rationale, Specifics   25 08468 Therapeutic Exercise (timed):  increase ROM, strength, coordination, balance, and proprioception to improve patient's ability to progress to PLOF and address remaining functional goals. (see flow sheet as applicable)     Details if applicable:    AAROM, stretches, pendulums, shoulder rolls, strengthening interventions     12 86147 Therapeutic Activity (timed):  use of dynamic activities replicating functional movements to increase ROM, strength, coordination, balance, and proprioception in order to improve patient's ability to progress to PLOF and address remaining functional goals. (see flow sheet as applicable)     FUNCTIONAL REASSESSMENT      10 07540 Manual Therapy (timed):  decrease pain, increase ROM, and increase tissue extensibility to improve patient's ability to progress to PLOF and address remaining functional goals. The manual therapy interventions were performed at a separate and distinct time from the therapeutic activities interventions .  (see flow sheet as applicable)     Details if applicable:    PROM all directions; GHJ distraction; grade 1-2 distraction mobs    52 [facility] Totals Reminder: bill using total billable min of TIMED therapeutic procedures (example: do not include dry needle or estim unattended, both untimed codes, in totals to left)  8-22 min = 1 unit; 23-37 min = 2 units;
obtain with minimal VC from therapist  Status at IE:  visual and verbal cuing provided   Current: mild FHRS , continued cuing     Patient to restore shoulder PROM to WNL  Status at IE: limited and guarding   Current : progressing/ not met at above      Long Term Goals: To be accomplished in 6-12 weeks  Patient to restore shoulder AROM to WNL and pain free   Status at IE: limited and painful   Current: L Shoulder AROM: flexion 47, ABD 47, ER 20  FIR NT     Patient will be able to don/doff shirt and perform bathing ADLs without pain/difficulty  Status at IE: compensatory patterns, painful   Current: progressing - painful and modified     Patient to restore functional reach and be able to manage getting plate/cup in and out of overhead cupboard to restore independence   Status at IE: unable   Current: unable     Patient will return to biking without increased pain   Status at IE: unable   Current: unable     Patient to restore UE strength to at least 4+/5 in all appropriate muscle groups for restoration of functional tasks  Status at IE: <4+/5   Current: unable per protocol        New Goals to be achieved in __8-10__ treatments  Cont unmet LTG as above   Add FOTO goal   Patient will met FOTO DC goal for improved functional mobility to return to PLOF     Frequency / Duration:   Patient to be seen   2-3   times per week for   8-10    treatments:    RECOMMENDATIONS  We would like to continue therapy for progress to goals stated above. Continue therapy with changes to Plan of Care to include: cont to progress to LTG per protocol     If you have any questions/comments please contact us directly. Thank you for allowing us to assist in the care of your patient.     Jenni Vyas, PT

## 2023-10-23 ENCOUNTER — HOSPITAL ENCOUNTER (OUTPATIENT)
Facility: HOSPITAL | Age: 53
Setting detail: RECURRING SERIES
Discharge: HOME | End: 2023-10-26
Payer: COMMERCIAL

## 2023-10-23 PROCEDURE — 97530 THERAPEUTIC ACTIVITIES: CPT

## 2023-10-23 PROCEDURE — 97140 MANUAL THERAPY 1/> REGIONS: CPT

## 2023-10-23 PROCEDURE — 97110 THERAPEUTIC EXERCISES: CPT

## 2023-10-26 ENCOUNTER — HOSPITAL ENCOUNTER (OUTPATIENT)
Facility: HOSPITAL | Age: 53
Setting detail: RECURRING SERIES
Discharge: HOME | End: 2023-10-29
Payer: COMMERCIAL

## 2023-10-26 PROCEDURE — 97140 MANUAL THERAPY 1/> REGIONS: CPT

## 2023-10-26 PROCEDURE — 97530 THERAPEUTIC ACTIVITIES: CPT

## 2023-10-26 PROCEDURE — 97110 THERAPEUTIC EXERCISES: CPT

## 2023-10-26 NOTE — PROGRESS NOTES
PHYSICAL / OCCUPATIONAL THERAPY - DAILY TREATMENT NOTE (updated )    Patient Name: Evelyn Byrnes    Date: 10/26/2023    : 1970  Insurance: Payor: Marsha Grijalva / Plan: OPTIMA PPO / Product Type: *No Product type* /      Patient  verified Yes     Visit #   Current / Total 1 10   Time   In / Out 7:01 am 7:40 am   Pain   In / Out 1/10 1/10   Subjective Functional Status/Changes: Patient notes most limitation with reaching out to the side. TREATMENT AREA =  Left shoulder pain [M25.512]    OBJECTIVE  Patient declined modalities          Therapeutic Procedures: Tx Min   Procedure, Rationale, Specifics   11 80939 Therapeutic Exercise (timed):  increase ROM, strength, coordination, balance, and proprioception to improve patient's ability to progress to PLOF and address remaining functional goals. (see flow sheet as applicable)     Details if applicable:    AAROM, stretches, pendulums, shoulder rolls, strengthening interventions     10 00971 Therapeutic Activity (timed):  use of dynamic activities replicating functional movements to increase ROM, strength, coordination, balance, and proprioception in order to improve patient's ability to progress to PLOF and address remaining functional goals. (see flow sheet as applicable)          18 20948 Manual Therapy (timed):  decrease pain, increase ROM, and increase tissue extensibility to improve patient's ability to progress to PLOF and address remaining functional goals. The manual therapy interventions were performed at a separate and distinct time from the therapeutic activities interventions .  (see flow sheet as applicable)     Details if applicable:    PROM ER, flexionn; GHJ distraction; grade 1-2 distraction mobs; DTM (L) UT   44 [facility] Totals Reminder: bill using total billable min of TIMED therapeutic procedures (example: do not include dry needle or estim unattended, both untimed codes, in totals to left)  8-22 min = 1 unit; 23-37 min = 2 units; 38-52

## 2023-10-31 ENCOUNTER — APPOINTMENT (OUTPATIENT)
Facility: HOSPITAL | Age: 53
End: 2023-10-31
Payer: COMMERCIAL

## 2023-11-01 ENCOUNTER — OFFICE VISIT (OUTPATIENT)
Age: 53
End: 2023-11-01

## 2023-11-01 VITALS — BODY MASS INDEX: 29.12 KG/M2 | WEIGHT: 208 LBS | HEIGHT: 71 IN

## 2023-11-01 DIAGNOSIS — S43.432D SUPERIOR GLENOID LABRUM LESION OF LEFT SHOULDER, SUBSEQUENT ENCOUNTER: ICD-10-CM

## 2023-11-01 DIAGNOSIS — S46.012D TRAUMATIC COMPLETE TEAR OF LEFT ROTATOR CUFF, SUBSEQUENT ENCOUNTER: Primary | ICD-10-CM

## 2023-11-01 PROCEDURE — 99024 POSTOP FOLLOW-UP VISIT: CPT | Performed by: ORTHOPAEDIC SURGERY

## 2023-11-06 ENCOUNTER — HOSPITAL ENCOUNTER (OUTPATIENT)
Facility: HOSPITAL | Age: 53
Setting detail: RECURRING SERIES
Discharge: HOME | End: 2023-11-09
Payer: COMMERCIAL

## 2023-11-06 PROCEDURE — 97110 THERAPEUTIC EXERCISES: CPT

## 2023-11-06 PROCEDURE — 97530 THERAPEUTIC ACTIVITIES: CPT

## 2023-11-06 PROCEDURE — 97140 MANUAL THERAPY 1/> REGIONS: CPT

## 2023-11-06 NOTE — PROGRESS NOTES
PHYSICAL / OCCUPATIONAL THERAPY - DAILY TREATMENT NOTE (updated )    Patient Name: Evelin Blanchard    Date: 2023    : 1970  Insurance: Payor: Conchita Holloway / Plan: OPTIMA PPO / Product Type: *No Product type* /      Patient  verified Yes     Visit #   Current / Total 2 10   Time   In / Out 5:40 pm 6:24 pm   Pain   In / Out 2/10 1/10   Subjective Functional Status/Changes: Patient reports f/u with MD recently, who recommended patient progress to AROM and strengthening phase of protocol. Notes patient has 47 degrees of forward reach. TREATMENT AREA =  Left shoulder pain [M25.512]    OBJECTIVE  Patient declined modalities          Therapeutic Procedures: Tx Min   Procedure, Rationale, Specifics   11 43257 Therapeutic Exercise (timed):  increase ROM, strength, coordination, balance, and proprioception to improve patient's ability to progress to PLOF and address remaining functional goals. (see flow sheet as applicable)     Details if applicable:    AAROM, stretches, pendulums     18 51727 Therapeutic Activity (timed):  use of dynamic activities replicating functional movements to increase ROM, strength, coordination, balance, and proprioception in order to improve patient's ability to progress to PLOF and address remaining functional goals. (see flow sheet as applicable)     Pulling, BOW for gentle deltoid short-lever strengthening     15 94271 Manual Therapy (timed):  decrease pain, increase ROM, and increase tissue extensibility to improve patient's ability to progress to PLOF and address remaining functional goals. The manual therapy interventions were performed at a separate and distinct time from the therapeutic activities interventions .  (see flow sheet as applicable)     Details if applicable:    Scapular mobs  PROM/passive stretching ER, flexionn; GHJ distraction; grade 1-2 distraction mobs; DTM (L) UT   40 [facility] BC Totals Reminder: bill using total billable min of TIMED therapeutic

## 2023-11-13 ENCOUNTER — HOSPITAL ENCOUNTER (OUTPATIENT)
Facility: HOSPITAL | Age: 53
Setting detail: RECURRING SERIES
Discharge: HOME | End: 2023-11-16
Payer: COMMERCIAL

## 2023-11-13 PROCEDURE — 97110 THERAPEUTIC EXERCISES: CPT

## 2023-11-13 PROCEDURE — 97530 THERAPEUTIC ACTIVITIES: CPT

## 2023-11-13 PROCEDURE — 97140 MANUAL THERAPY 1/> REGIONS: CPT

## 2023-11-13 PROCEDURE — 97016 VASOPNEUMATIC DEVICE THERAPY: CPT

## 2023-11-13 NOTE — PROGRESS NOTES
deficits, analyze and address soft tissue restrictions, analyze and cue for proper movement patterns, analyze and modify for postural abnormalities, and instruct in home and community integration to address functional deficits and attain remaining goals. Progress toward goals / Updated goals:  []  See Progress Note/Recertification    Short Term Goals: To be accomplished in 2-4 weeks    Patient will be independent with prescribed HEP           Status at IE : patient reports intermittent compliance sec to wife injury     Patient will verbalize understanding and consistent compliance with post op protocol/restrictions  Status at IE: reviewed   Current continued progress / understanding      Patient to improve postural awareness as noted by ability to obtain with minimal VC from therapist  Status at IE:  mild FHRS , continued cuing   Current: mild FHRS (11/13/23)    Patient to restore shoulder PROM to WNL  Status at IE: progressing/not met at above      Long Term Goals:  To be accomplished in 6-12 weeks  Patient to restore shoulder AROM to WNL and pain free   Status at IE: L Shoulder AROM: flexion 47, ABD 47, ER 20  FIR NT   Current: (L) shoulder AROM flexion 76 deg (11/13/23)    Patient will be able to don/doff shirt and perform bathing ADLs without pain/difficulty  Status at IE: painful and modified     Patient to restore functional reach and be able to manage getting plate/cup in and out of overhead cupboard to restore independence   Status at IE: unable     Patient will return to biking without increased pain   Status at IE: unable     Patient to restore UE strength to at least 4+/5 in all appropriate muscle groups for restoration of functional tasks  Status at IE: unable per protocol     Next PN/RC due 10/24/23  Auth due after 15 visits    PLAN  - Continue Plan of [address] [place] [address]    11/13/2023      Future Appointments   Date Time Provider [address] [place] [address]   11/17/2023  7:00 AM Tamica Johnson, PT

## 2023-11-17 ENCOUNTER — HOSPITAL ENCOUNTER (OUTPATIENT)
Facility: HOSPITAL | Age: 53
Setting detail: RECURRING SERIES
Discharge: HOME | End: 2023-11-20
Payer: COMMERCIAL

## 2023-11-17 PROCEDURE — 97140 MANUAL THERAPY 1/> REGIONS: CPT

## 2023-11-17 PROCEDURE — 97112 NEUROMUSCULAR REEDUCATION: CPT

## 2023-11-17 PROCEDURE — 97110 THERAPEUTIC EXERCISES: CPT

## 2023-11-17 NOTE — PROGRESS NOTES
PHYSICAL / OCCUPATIONAL THERAPY - DAILY TREATMENT NOTE (updated )    Patient Name: Lily Meier    Date: 2023    : 1970  Insurance: Payor: Claudio Alfaro / Plan: OPTIMA PPO / Product Type: *No Product type* /      Patient  verified yes     Visit #   Current / Total 4 8-10 Total Time   Time   In / Out 7:00 8:04 64   Pain   In / Out 1-2 2-3    Subjective Functional Status/Changes: Just a little stiffness and soreness today     TREATMENT AREA =  Left shoulder pain [M25.512]    OBJECTIVE    Ice (UNBILLED):  location/position: reclined, left shoulder     Min Rationale   10 decrease inflammation and decrease pain to improve patient's ability to progress to PLOF and address remaining functional goals. Skin assessment post-treatment:   Intact     Therapeutic Procedures:  47  Total   Total [facility] Totals Reminder: bill using total billable min of TIMED therapeutic procedures (example: do not include dry needle or estim unattended, both untimed codes, in totals to left)  8-22 min = 1 unit; 23-37 min = 2 units; 38-52 min = 3 units; 53-67 min = 4 units; 68-82 min = 5 units   Tx Min Billable or 1:1 Min (if diff from Tx Min) Procedure, Rationale, Specifics   15  38105 Therapeutic Exercise (timed):  increase ROM, strength, coordination, balance, and proprioception to improve patient's ability to progress to PLOF and address remaining functional goals. (see flow sheet as applicable)   Details if applicable:       29  35095 Neuromuscular Re-Education (timed):  improve balance, coordination, kinesthetic sense, posture, core stability and proprioception to improve patient's ability to develop conscious control of individual muscles and awareness of position of extremities in order to progress to PLOF and address remaining functional goals.  (see flow sheet as applicable)     Details if applicable:       10  93665 Manual Therapy (timed):  decrease pain, increase ROM, increase tissue extensibility, decrease trigger

## 2023-11-21 ENCOUNTER — HOSPITAL ENCOUNTER (OUTPATIENT)
Facility: HOSPITAL | Age: 53
Setting detail: RECURRING SERIES
Discharge: HOME | End: 2023-11-24
Payer: COMMERCIAL

## 2023-11-21 PROCEDURE — 97112 NEUROMUSCULAR REEDUCATION: CPT

## 2023-11-21 PROCEDURE — 97016 VASOPNEUMATIC DEVICE THERAPY: CPT

## 2023-11-21 PROCEDURE — 97530 THERAPEUTIC ACTIVITIES: CPT

## 2023-11-21 PROCEDURE — 97140 MANUAL THERAPY 1/> REGIONS: CPT

## 2023-11-21 PROCEDURE — 97110 THERAPEUTIC EXERCISES: CPT

## 2023-11-21 NOTE — PROGRESS NOTES
[address] [facility],[address] [facility]   [address] [facility] [address] [place], [address]  Phone: [phone] Fax: [phone]  PROGRESS NOTE  Patient Name: Pete Lynne : 1970   Treatment/Medical Diagnosis: Left shoulder pain [M25.512]   Referral Source:  Paulla Aschoff, MD  Payor: Rios Cease / Plan: OPTIMA PPO / Product Type: *No Product type* /      Date of Initial Visit: 2023 Attended Visits: 10 Missed Visits: 2     SUMMARY OF TREATMENT  Patient fahad 47year old male who has been seen in PT status post left RTC repair and biceps tenodesis (2023). Treatment thus far has consisted of therapeutic exercises to address strength/ROM deficits, therapeutic activities for functional movement restoration, neuromuscular re-education for static/dynamic stabilization, patient education on self care strategies and HEP, manual therapy to address soft tissue restrictions, and modalities for symptom modulation. Patient progressing well with therapy thus far. However, had a fall yesterday evening causing increased shoulder pain which may have impacted today's reassessment values. Will continue to assess in follow up sessions.        CURRENT STATUS    Subjective Deficits: dressing, lifting/reaching overhead, reaching behind, bathing    Shoulder AROM:   Shoulder Flexion: 104 degrees  Shoulder Abduction: 80 degrees  Shoulder Functional ER: FT to external auditory meatus  Shoulder Functional IR: FT to T12     Shoulder MMT:  3-/5 due to ROM deficits     Observation:  Patient has visible bruising over anterolateral aspect of brachium from recent fall; tendency to elevate scapulae with abduction/scaption   Bicep curl pain free    Palpation: TTP proximal biceps, anterior incision marian, infraspinatus       Patient will continue to benefit from skilled PT / OT services to modify and progress therapeutic interventions, analyze and address functional mobility deficits, analyze and
pain/difficulty  Status at IE: painful and modified   Current:  progressing, still self reports deficits getting white coat, tight tops, and surgical mask (11/21/2023)  - Patient to restore functional reach and be able to manage getting plate/cup in and out of overhead cupboard to restore independence   Status at IE: unable    Current: progressing, able to reach below shoulder height but difficulty with reach over shoulder height (11/21/2023)   - Patient will return to biking without increased pain   Status at IE: unable    Current: progressing, still unable (11/21/2023)   - Patient to restore UE strength to at least 4+/5 in all appropriate muscle groups for restoration of functional tasks  Status at IE: unable per protocol   Current: progressing, addressing with strengthening interventions per post op protocol (11/21/2023)      Auth due after 15 visits    PLAN  [x] Continue plan of care  [x]  Upgrade activities as tolerated  []  Discharge due to :  []  Other:    Truman Dewey PT    11/21/2023    12:19 PM    Future Appointments   Date Time Provider [address] [place] [address]   11/21/2023  5:40 PM Truman Dewey PT [facility]   12/12/2023  8:00 AM Robyn Pryor MD [facility] BS AMB   2/29/2024  3:40 PM Andi Cano MD [facility] BS AMB

## 2023-12-05 ENCOUNTER — HOSPITAL ENCOUNTER (OUTPATIENT)
Facility: HOSPITAL | Age: 53
Setting detail: RECURRING SERIES
Discharge: HOME | End: 2023-12-08
Payer: COMMERCIAL

## 2023-12-05 PROCEDURE — 97110 THERAPEUTIC EXERCISES: CPT

## 2023-12-05 PROCEDURE — 97112 NEUROMUSCULAR REEDUCATION: CPT

## 2023-12-05 PROCEDURE — 97140 MANUAL THERAPY 1/> REGIONS: CPT

## 2023-12-05 NOTE — PROGRESS NOTES
PHYSICAL / OCCUPATIONAL THERAPY - DAILY TREATMENT NOTE (updated )    Patient Name: Daniela Carmona    Date: 2023    : 1970  Insurance: Payor: Nadeen Lemon / Plan: OPTIMA PPO / Product Type: *No Product type* /      Patient  verified yes     Visit #   Current / Total 1 8-10 Total Time   Time   In / Out 7:00 am 7:52 am 52   Pain   In / Out 0/10 0/10    Subjective Functional Status/Changes: \"I have had random episodes of sharp pain, either at rest or randomly with certain movements. I spoke with my doctor and he said as long as I don't have any loss of function. \"     TREATMENT AREA =  Left shoulder pain [M25.512]    OBJECTIVE    Modalities Rationale:     decrease edema, decrease inflammation, and decrease pain to improve patient's ability to progress to PLOF and address remaining functional goals. min [] Ice Pack        []  Heat Pack  Location/Details:          min [] Estim Unattended Location/Details:                                     []  w/ice    []  w/heat         min []  Mechanical Traction Location/Details:                  []  pro   []  sup   []  int   []  cont    []  before manual    []  after manual        5 min [x]  Vasopneumatic Device Location/Details: mod pressure; left shoulder      If using vaso (only need to measure limb vaso being performed on)      pre-treatment girth :       post-treatment girth :       measured at (landmark location) :     Skin assessment post-treatment (if applicable):    [x]  Intact- no adverse reaction  []  redness- no adverse reaction                 []redness - adverse reaction:         Therapeutic Procedures:     Total  52  [facility] Totals Reminder: bill using total billable min of TIMED therapeutic procedures (example: do not include dry needle or estim unattended, both untimed codes, in totals to left)  8-22 min = 1 unit; 23-37 min = 2 units; 38-52 min = 3 units; 53-67 min = 4 units; 68-82 min = 5 units   Tx Min  Procedure, Rationale, Specifics   14  [id number]

## 2023-12-08 ENCOUNTER — HOSPITAL ENCOUNTER (OUTPATIENT)
Facility: HOSPITAL | Age: 53
Setting detail: RECURRING SERIES
Discharge: HOME | End: 2023-12-11
Payer: COMMERCIAL

## 2023-12-08 PROCEDURE — 97110 THERAPEUTIC EXERCISES: CPT

## 2023-12-08 PROCEDURE — 97140 MANUAL THERAPY 1/> REGIONS: CPT

## 2023-12-08 PROCEDURE — 97112 NEUROMUSCULAR REEDUCATION: CPT

## 2023-12-08 NOTE — PROGRESS NOTES
injury (11/21/2023)   Patient to restore shoulder PROM to WNL  Status at PN: improving but limited    Patient to restore shoulder AROM to WNL and pain free   Status at IE: L Shoulder AROM: flexion 47, ABD 47, ER 20  FIR NT   Current: ER AAROM 50 deg (12/8/23)  - Patient will be able to don/doff shirt and perform bathing ADLs without pain/difficulty  Status at IE: painful and modified   Current:  progressing, still self reports deficits getting white coat, tight tops, and surgical mask (11/21/2023)  - Patient to restore functional reach and be able to manage getting plate/cup in and out of overhead cupboard to restore independence   Status at IE: unable    Current: progressing, able to reach below shoulder height but difficulty with reach over shoulder height (11/21/2023)   - Patient will return to biking without increased pain   Status at PN: still unable  - Patient to restore UE strength to at least 4+/5 in all appropriate muscle groups for restoration of functional tasks  Status at PN: addressing with strengthening interventions per post op protocol       Auth due after 15 visits    PLAN  [x] Continue plan of care  [x]  Upgrade activities as tolerated  []  Discharge due to :  []  Other:    Isaiah Fabian PT    12/8/2023    7:00 AM    Future Appointments   Date Time Provider [address]   12/12/2023  8:00 AM Harsh Lyons MD [facility] AMB   12/14/2023  7:40 AM Isaiah Fabian PT [facility]   12/18/2023  7:00 AM Richard Moore, PT [facility]   12/20/2023  5:20 PM Kareen Bundy PT [facility]   12/26/2023  8:20 AM Stella Carvalho, LUKE [facility]   12/28/2023  7:00 AM Stella Carvalho PTA [facility]   1/2/2024  5:40 PM Karyle Helm, PT [facility]   1/4/2024  7:00 AM Stella Carvalho PTA [facility]   2/29/2024  3:40 PM Arizona Bright Oviedo MD [facility] BS AMB

## 2023-12-14 ENCOUNTER — APPOINTMENT (OUTPATIENT)
Facility: HOSPITAL | Age: 53
End: 2023-12-14
Payer: COMMERCIAL

## 2023-12-26 ENCOUNTER — HOSPITAL ENCOUNTER (OUTPATIENT)
Facility: HOSPITAL | Age: 53
Setting detail: RECURRING SERIES
Discharge: HOME | End: 2023-12-29
Payer: COMMERCIAL

## 2023-12-26 PROCEDURE — 97110 THERAPEUTIC EXERCISES: CPT

## 2023-12-26 PROCEDURE — 97112 NEUROMUSCULAR REEDUCATION: CPT

## 2023-12-26 PROCEDURE — 97140 MANUAL THERAPY 1/> REGIONS: CPT

## 2023-12-28 ENCOUNTER — APPOINTMENT (OUTPATIENT)
Facility: HOSPITAL | Age: 53
End: 2023-12-28
Payer: COMMERCIAL

## 2024-01-02 ENCOUNTER — HOSPITAL ENCOUNTER (OUTPATIENT)
Facility: HOSPITAL | Age: 54
Setting detail: RECURRING SERIES
Discharge: HOME | End: 2024-01-05
Payer: COMMERCIAL

## 2024-01-02 PROCEDURE — 97112 NEUROMUSCULAR REEDUCATION: CPT

## 2024-01-02 PROCEDURE — 97016 VASOPNEUMATIC DEVICE THERAPY: CPT

## 2024-01-02 PROCEDURE — 97110 THERAPEUTIC EXERCISES: CPT

## 2024-01-02 PROCEDURE — 97140 MANUAL THERAPY 1/> REGIONS: CPT

## 2024-01-02 NOTE — PROGRESS NOTES
PHYSICAL / OCCUPATIONAL THERAPY - DAILY TREATMENT NOTE (updated )    Patient Name: Octavio Medina    Date: 2024    : 1970  Insurance: Payor: /      Patient  verified yes     Visit #   Current / Total 3 10 Total Time   Time   In / Out 5:41 PM 6:42 PM  61   Pain   In / Out 2/10  0/10    Subjective Functional Status/Changes: \"I still have problems getting my surgical mask on, reaching up, doing work tasks that involve holding my arm up and sometimes getting my white coat on.\"      TREATMENT AREA =  Left shoulder pain [M25.512]    OBJECTIVE  Modalities Rationale:     decrease edema, decrease inflammation, and decrease pain to improve patient's ability to progress to PLOF and address remaining functional goals.    10 min [x]  Vasopneumatic Device, press/temp: Moderate; low 34 deg F    min []  Whirlpool / Fluido:    If using vaso (only need to measure limb vaso being performed on)      pre-treatment girth :       post-treatment girth :       measured at (landmark location) :      min []  Other:    Skin assessment post-treatment:   Intact         Therapeutic Procedures:    Total  51  [facility] Totals Reminder: bill using total billable min of TIMED therapeutic procedures (example: do not include dry needle or estim unattended, both untimed codes, in totals to left)  8-22 min = 1 unit; 23-37 min = 2 units; 38-52 min = 3 units; 53-67 min = 4 units; 68-82 min = 5 units   Tx Min  Procedure, Rationale, Specifics   32  80190 Therapeutic Exercise (timed):  increase ROM, strength, coordination, balance, and proprioception to improve patient's ability to progress to PLOF and address remaining functional goals. (see flow sheet as applicable)     Details if applicable:  UBE, mobility, strengthening     11  16122 Neuromuscular Re-Education (timed):  improve balance, coordination, kinesthetic sense, posture, core stability and proprioception to improve patient's ability to develop conscious control of individual

## 2024-01-04 ENCOUNTER — HOSPITAL ENCOUNTER (OUTPATIENT)
Facility: HOSPITAL | Age: 54
Setting detail: RECURRING SERIES
Discharge: HOME | End: 2024-01-07
Payer: COMMERCIAL

## 2024-01-04 PROCEDURE — 97112 NEUROMUSCULAR REEDUCATION: CPT

## 2024-01-04 PROCEDURE — 97140 MANUAL THERAPY 1/> REGIONS: CPT

## 2024-01-04 PROCEDURE — 97110 THERAPEUTIC EXERCISES: CPT

## 2024-01-04 NOTE — PROGRESS NOTES
PHYSICAL / OCCUPATIONAL THERAPY - DAILY TREATMENT NOTE (updated )    Patient Name: Octavio Medina    Date: 2024    : 1970  Insurance: Payor: SENTARA / Plan: SENTARA PLUS (PPO) / Product Type: *No Product type* /      Patient  verified yes     Visit #   Current / Total 4 10 Total Time   Time   In / Out 7:00 am 7:42 am 42   Pain   In / Out 1/10  010    Subjective Functional Status/Changes: Patient notes soreness after last session. \"I am all up for sore if it leads to progress.\"     TREATMENT AREA =  Left shoulder pain [M25.512]    OBJECTIVE  PD modalities.      Therapeutic Procedures:    Total  42  [facility] Totals Reminder: bill using total billable min of TIMED therapeutic procedures (example: do not include dry needle or estim unattended, both untimed codes, in totals to left)  8-22 min = 1 unit; 23-37 min = 2 units; 38-52 min = 3 units; 53-67 min = 4 units; 68-82 min = 5 units   Tx Min  Procedure, Rationale, Specifics   14  50905 Therapeutic Exercise (timed):  increase ROM, strength, coordination, balance, and proprioception to improve patient's ability to progress to PLOF and address remaining functional goals. (see flow sheet as applicable)     Details if applicable:  UBE, mobility, strengthening     11  94414 Neuromuscular Re-Education (timed):  improve balance, coordination, kinesthetic sense, posture, core stability and proprioception to improve patient's ability to develop conscious control of individual muscles and awareness of position of extremities in order to progress to PLOF and address remaining functional goals. (see flow sheet as applicable)     Details if applicable: scapular and rotator cuff re-ed       15  00088 Manual Therapy (timed):  decrease pain, increase ROM, increase tissue extensibility, decrease trigger points, and increase postural awareness to improve patient's ability to progress to PLOF and address remaining functional goals.  The manual therapy interventions

## 2024-01-11 ENCOUNTER — HOSPITAL ENCOUNTER (OUTPATIENT)
Facility: HOSPITAL | Age: 54
Setting detail: RECURRING SERIES
Discharge: HOME | End: 2024-01-14
Payer: COMMERCIAL

## 2024-01-11 PROCEDURE — 97112 NEUROMUSCULAR REEDUCATION: CPT

## 2024-01-11 PROCEDURE — 97110 THERAPEUTIC EXERCISES: CPT

## 2024-01-11 PROCEDURE — 97140 MANUAL THERAPY 1/> REGIONS: CPT

## 2024-01-11 RX ORDER — ESCITALOPRAM OXALATE 20 MG/1
20 TABLET ORAL DAILY
Qty: 90 TABLET | Refills: 3 | Status: SHIPPED | OUTPATIENT
Start: 2024-01-11

## 2024-01-11 NOTE — PROGRESS NOTES
flow sheet as applicable)     Details if applicable:  grade II/III inferior/posterior mobilizations; manual shoulder flexion/ER stretching with distraction applied throughout       [x]  Patient Education billed concurrently with other procedures   [x] Review HEP    [] Progressed/Changed HEP, detail:    [] Other detail:       Objective Information/Functional Measures/Assessment    -Shoulder flexion in supine initially quite exquisitely painful at approximately  deg; however, with tactile and verbal cues from therapist, patient able to perform shoulder flexion in supine through full AROM with awareness of scapular protraction throughout motion. This same focus improved ease with standing wall slides.  -Shoulder flexion AAROM 157 deg in supine, ER AAROM 45 deg     Patient will continue to benefit from skilled PT / OT services to modify and progress therapeutic interventions, analyze and address functional mobility deficits, analyze and address ROM deficits, analyze and address strength deficits, analyze and address soft tissue restrictions, analyze and cue for proper movement patterns, analyze and modify for postural abnormalities, analyze and address imbalance/dizziness, and instruct in home and community integration to address functional deficits and attain remaining goals.    Progress toward goals / Updated goals:  []  See Progress Note/Recertification    -Patient will be independent with prescribed HEP           Status at IE : patient reports intermittent compliance sec to wife injury   Current: patient reports compliance with HEP 4x per week    -Patient to restore shoulder PROM to WNL  Status at PN: improving but limited    Current: persistent limitations all range active and passive, addressing with ROM therex and manual interventions (12/20/23)    -Patient to restore shoulder AROM to WNL and pain free   Status at IE: L Shoulder AROM: flexion 47, ABD 47, ER 20  FIR NT   Current: flexion 88, scaption 90, ER

## 2024-01-16 ENCOUNTER — HOSPITAL ENCOUNTER (OUTPATIENT)
Facility: HOSPITAL | Age: 54
Setting detail: RECURRING SERIES
Discharge: HOME | End: 2024-01-19
Payer: COMMERCIAL

## 2024-01-16 PROCEDURE — 97016 VASOPNEUMATIC DEVICE THERAPY: CPT

## 2024-01-16 PROCEDURE — 97112 NEUROMUSCULAR REEDUCATION: CPT

## 2024-01-16 PROCEDURE — 97110 THERAPEUTIC EXERCISES: CPT

## 2024-01-16 PROCEDURE — 97140 MANUAL THERAPY 1/> REGIONS: CPT

## 2024-01-16 NOTE — PROGRESS NOTES
able to don/doff shirt and perform bathing ADLs without pain/difficulty  Status at IE: painful and modified   Current: modifications still needed for hair, deodorant, etc  - Patient to restore functional reach and be able to manage getting plate/cup in and out of overhead cupboard to restore independence   Status at IE: unable   Current:unable to perform overhead activities   - Patient will return to biking without increased pain   Status at PN: still unable  Current: restricted based on home life  - Patient to restore UE strength to at least 4+/5 in all appropriate muscle groups for restoration of functional tasks  Status at PN: addressing with strengthening interventions per post op protocol    Current:not met <3/5 in available range      Auth due after 15 visits  PN due 1/17    PLAN  [x] Continue plan of care  [x]  Upgrade activities as tolerated  []  Discharge due to :  []  Other:    Gretchen Wild PT    1/16/2024    12:16 PM    Future Appointments   Date Time Provider Department Center   1/16/2024  5:40 PM Gretchen Wild, [place] [facility]   1/18/2024  7:00 AM Chris Lu PTA [facility]   1/23/2024  7:00 AM Chris Lu PTA [facility]   1/25/2024  7:00 AM Chris Lu PTA [facility]   1/30/2024  7:00 AM Chris Lu PTA [facility]   2/1/2024  7:00 AM Chuckie Vaughan PT [facility]   2/29/2024  3:40 PM Angeli Jorge MD [facility] BS AMB

## 2024-01-18 ENCOUNTER — HOSPITAL ENCOUNTER (OUTPATIENT)
Facility: HOSPITAL | Age: 54
Setting detail: RECURRING SERIES
Discharge: HOME | End: 2024-01-21
Payer: COMMERCIAL

## 2024-01-18 PROCEDURE — 97110 THERAPEUTIC EXERCISES: CPT

## 2024-01-18 PROCEDURE — 97140 MANUAL THERAPY 1/> REGIONS: CPT

## 2024-01-18 NOTE — PROGRESS NOTES
PHYSICAL / OCCUPATIONAL THERAPY - DAILY TREATMENT NOTE (updated )    Patient Name: Octavio Medina    Date: 2024    : 1970  Insurance: Payor: SENTARA / Plan: SENTARA PLUS (PPO) / Product Type: *No Product type* /      Patient  verified yes     Visit #   Current / Total 7 10 Total Time   Time   In / Out 7:00 am 7:50 am 50   Pain   In / Out 0 0    Subjective Functional Status/Changes: \"Still having trouble holding my arm at eye level.\"      TREATMENT AREA =  Left shoulder pain [M25.512]    OBJECTIVE    Therapeutic Procedures:    Total  50  [facility] Totals Reminder: bill using total billable min of TIMED therapeutic procedures (example: do not include dry needle or estim unattended, both untimed codes, in totals to left)  8-22 min = 1 unit; 23-37 min = 2 units; 38-52 min = 3 units; 53-67 min = 4 units; 68-82 min = 5 units   Tx Min  Procedure, Rationale, Specifics   30  18292 Therapeutic Exercise (timed):  increase ROM, strength, coordination, balance, and proprioception to improve patient's ability to progress to PLOF and address remaining functional goals. (see flow sheet as applicable)     Details if applicable:  strengthening, UBE, stretching, mobility, reassessment      10  05224 Neuromuscular Re-Education (timed):  improve balance, coordination, kinesthetic sense, posture, core stability and proprioception to improve patient's ability to develop conscious control of individual muscles and awareness of position of extremities in order to progress to PLOF and address remaining functional goals. (see flow sheet as applicable)     Details if applicable: GHJ/scapular re-ed, rhythmic stab        10  43118 Manual Therapy (timed):  decrease pain, increase ROM, increase tissue extensibility, decrease trigger points, and increase postural awareness to improve patient's ability to progress to PLOF and address remaining functional goals.  The manual therapy interventions were performed at a separate and

## 2024-01-23 ENCOUNTER — HOSPITAL ENCOUNTER (OUTPATIENT)
Facility: HOSPITAL | Age: 54
Setting detail: RECURRING SERIES
Discharge: HOME | End: 2024-01-26
Payer: COMMERCIAL

## 2024-01-23 PROCEDURE — 97530 THERAPEUTIC ACTIVITIES: CPT

## 2024-01-23 PROCEDURE — 97140 MANUAL THERAPY 1/> REGIONS: CPT

## 2024-01-23 PROCEDURE — 97110 THERAPEUTIC EXERCISES: CPT

## 2024-01-23 PROCEDURE — 97112 NEUROMUSCULAR REEDUCATION: CPT

## 2024-01-23 NOTE — PROGRESS NOTES
PHYSICAL / OCCUPATIONAL THERAPY - DAILY TREATMENT NOTE (updated )    Patient Name: Octavio Medina    Date: 2024    : 1970  Insurance: Payor: SENTARA / Plan: SENTARA PLUS (PPO) / Product Type: *No Product type* /      Patient  verified yes     Visit #   Current / Total 1 10 Total Time   Time   In / Out 7:00 am 7:52 am 52   Pain   In / Out 1/10 010    Subjective Functional Status/Changes: \"I have been doing the rotator cuff exercise the most. I did one set of 20 and 5 sets of 10.\"     TREATMENT AREA =  Left shoulder pain [M25.512]    OBJECTIVE  Modalities Rationale:     decrease inflammation and decrease pain to improve patient's ability to progress to PLOF and address remaining functional goals.     min [] Estim Unattended, type/location:                                      []  w/ice    []  w/heat    min [] Estim Attended, type/location:                                     []  w/US     []  w/ice    []  w/heat    []  TENS insruct      min []  Mechanical Traction: type/lbs                   []  pro   []  sup   []  int   []  cont    []  before manual    []  after manual    min []  Ultrasound, settings/location:     10 min  unbill [x]  Ice     []  Heat    location/position:     min []  Paraffin,  details:     min []  Vasopneumatic Device, press/temp:     min []  Whirlpool / Fluido:    If using vaso (only need to measure limb vaso being performed on)      pre-treatment girth :       post-treatment girth :       measured at (landmark location) :      min []  Other:    Skin assessment post-treatment:   Intact     Therapeutic Procedures:    Total  42  [facility] Totals Reminder: bill using total billable min of TIMED therapeutic procedures (example: do not include dry needle or estim unattended, both untimed codes, in totals to left)  8-22 min = 1 unit; 23-37 min = 2 units; 38-52 min = 3 units; 53-67 min = 4 units; 68-82 min = 5 units   Tx Min  Procedure, Rationale, Specifics   14  13368 Therapeutic

## 2024-01-25 ENCOUNTER — HOSPITAL ENCOUNTER (OUTPATIENT)
Facility: HOSPITAL | Age: 54
Setting detail: RECURRING SERIES
Discharge: HOME | End: 2024-01-28
Payer: COMMERCIAL

## 2024-01-25 PROCEDURE — 97110 THERAPEUTIC EXERCISES: CPT

## 2024-01-25 PROCEDURE — 97112 NEUROMUSCULAR REEDUCATION: CPT

## 2024-01-25 NOTE — PROGRESS NOTES
PHYSICAL / OCCUPATIONAL THERAPY - DAILY TREATMENT NOTE (updated )    Patient Name: Octavio Medina    Date: 2024    : 1970  Insurance: Payor: IZAARA / Plan: SENTARA PLUS (PPO) / Product Type: *No Product type* /      Patient  verified yes     Visit #   Current / Total 2 10 Total Time   Time   In / Out 7:06 7:49 43   Pain   In / Out 1-2 0    Subjective Functional Status/Changes: Just the usual discomfort today     TREATMENT AREA =  Left shoulder pain [M25.512]    OBJECTIVE      Therapeutic Procedures:  43  Total   [facility] Totals Reminder: bill using total billable min of TIMED therapeutic procedures (example: do not include dry needle or estim unattended, both untimed codes, in totals to left)  8-22 min = 1 unit; 23-37 min = 2 units; 38-52 min = 3 units; 53-67 min = 4 units; 68-82 min = 5 units   Tx Min  Procedure, Rationale, Specifics   18  54164 Therapeutic Exercise (timed):  increase ROM, strength, coordination, balance, and proprioception to improve patient's ability to progress to PLOF and address remaining functional goals. (see flow sheet as applicable)   Details if applicable:       25  44931 Neuromuscular Re-Education (timed):  improve balance, coordination, kinesthetic sense, posture, core stability and proprioception to improve patient's ability to develop conscious control of individual muscles and awareness of position of extremities in order to progress to PLOF and address remaining functional goals. (see flow sheet as applicable)     Details if applicable:           [x]  Patient Education billed concurrently with other procedures   [x] Review HEP    [] Progressed/Changed HEP, detail:    [] Other detail:       Objective Information/Functional Measures/Assessment    - The patient continues to report substantial functional challenges with functional external rotation due to substantial shoulder stiffness and overhead lifting due to feeling of catching and weakness in anterior

## 2024-01-30 ENCOUNTER — HOSPITAL ENCOUNTER (OUTPATIENT)
Facility: HOSPITAL | Age: 54
Setting detail: RECURRING SERIES
Discharge: HOME | End: 2024-02-02
Payer: COMMERCIAL

## 2024-01-30 PROCEDURE — 97110 THERAPEUTIC EXERCISES: CPT

## 2024-01-30 PROCEDURE — 97112 NEUROMUSCULAR REEDUCATION: CPT

## 2024-01-30 PROCEDURE — 97140 MANUAL THERAPY 1/> REGIONS: CPT

## 2024-01-30 NOTE — PROGRESS NOTES
PHYSICAL / OCCUPATIONAL THERAPY - DAILY TREATMENT NOTE (updated )    Patient Name: Octavio Medina    Date: 2024    : 1970  Insurance: Payor: [employer] / Plan: [employer] PLUS (PPO) / Product Type: *No Product type* /      Patient  verified yes     Visit #   Current / Total 3 10 Total Time   Time   In / Out 7:00 am 7:47 am 47   Pain   In / Out 1-2/10 0/10    Subjective Functional Status/Changes: \"Just achy. I still can't reach overhead.\"     TREATMENT AREA =  Left shoulder pain [M25.512]    OBJECTIVE  Modalities Rationale:     decrease inflammation and decrease pain to improve patient's ability to progress to PLOF and address remaining functional goals.     min [] Estim Unattended, type/location:                                      []  w/ice    []  w/heat    min [] Estim Attended, type/location:                                     []  w/US     []  w/ice    []  w/heat    []  TENS insruct      min []  Mechanical Traction: type/lbs                   []  pro   []  sup   []  int   []  cont    []  before manual    []  after manual    min []  Ultrasound, settings/location:     5 min  unbill [x]  Ice     []  Heat    location/position: (L) shoulder, semi-reclined    min []  Paraffin,  details:     min []  Vasopneumatic Device, press/temp:     min []  Whirlpool / Fluido:    If using vaso (only need to measure limb vaso being performed on)      pre-treatment girth :       post-treatment girth :       measured at (landmark location) :      min []  Other:    Skin assessment post-treatment:   Intact       Therapeutic Procedures:  42  Total  [facility] Totals Reminder: bill using total billable min of TIMED therapeutic procedures (example: do not include dry needle or estim unattended, both untimed codes, in totals to left)  8-22 min = 1 unit; 23-37 min = 2 units; 38-52 min = 3 units; 53-67 min = 4 units; 68-82 min = 5 units   Tx Min  Procedure, Rationale, Specifics   18  09703 Therapeutic Exercise (timed):

## 2024-02-01 ENCOUNTER — HOSPITAL ENCOUNTER (OUTPATIENT)
Facility: HOSPITAL | Age: 54
Setting detail: RECURRING SERIES
Discharge: HOME | End: 2024-02-04
Payer: COMMERCIAL

## 2024-02-01 PROCEDURE — 97112 NEUROMUSCULAR REEDUCATION: CPT

## 2024-02-01 PROCEDURE — 97140 MANUAL THERAPY 1/> REGIONS: CPT

## 2024-02-01 PROCEDURE — 97110 THERAPEUTIC EXERCISES: CPT

## 2024-02-01 NOTE — PROGRESS NOTES
PHYSICAL / OCCUPATIONAL THERAPY - DAILY TREATMENT NOTE (updated )    Patient Name: Octavio Medina    Date: 2024    : 1970  Insurance: Payor: [employer] / Plan: [employer] PLUS (PPO) / Product Type: *No Product type* /      Patient  verified yes     Visit #   Current / Total 4 10 Total Time   Time   In / Out 7:01 7:41 40   Pain   In / Out 1 0    Subjective Functional Status/Changes: Still working on reaching overhead.     TREATMENT AREA =  Left shoulder pain [M25.512]    OBJECTIVE      Therapeutic Procedures:  40  Total   [facility] Totals Reminder: bill using total billable min of TIMED therapeutic procedures (example: do not include dry needle or estim unattended, both untimed codes, in totals to left)  8-22 min = 1 unit; 23-37 min = 2 units; 38-52 min = 3 units; 53-67 min = 4 units; 68-82 min = 5 units   Tx Min  Procedure, Rationale, Specifics   14  83974 Therapeutic Exercise (timed):  increase ROM, strength, coordination, balance, and proprioception to improve patient's ability to progress to PLOF and address remaining functional goals. (see flow sheet as applicable)   Details if applicable:       16  74919 Neuromuscular Re-Education (timed):  improve balance, coordination, kinesthetic sense, posture, core stability and proprioception to improve patient's ability to develop conscious control of individual muscles and awareness of position of extremities in order to progress to PLOF and address remaining functional goals. (see flow sheet as applicable)     Details if applicable:       10  60481 Manual Therapy (timed):  decrease pain, increase ROM, increase tissue extensibility, decrease trigger points, and increase postural awareness to improve patient's ability to progress to PLOF and address remaining functional goals.  The manual therapy interventions were performed at a separate and distinct time from the therapeutic activities interventions . (see flow sheet as applicable)     Details if

## 2024-02-08 ENCOUNTER — HOSPITAL ENCOUNTER (OUTPATIENT)
Facility: HOSPITAL | Age: 54
Setting detail: RECURRING SERIES
Discharge: HOME | End: 2024-02-11
Payer: COMMERCIAL

## 2024-02-08 PROCEDURE — 97112 NEUROMUSCULAR REEDUCATION: CPT

## 2024-02-08 PROCEDURE — 97110 THERAPEUTIC EXERCISES: CPT

## 2024-02-08 NOTE — PROGRESS NOTES
to 110 deg before catching sensation in shoulder limits flexion past this point.  -Pt able to progress reclined flexion to 30 deg through full available motion. Was previously 15 deg one week ago.  -Increased amplitude of pertubations observed during [employer] compared to previous sessions.    Patient will continue to benefit from skilled PT / OT services to modify and progress therapeutic interventions, analyze and address functional mobility deficits, analyze and address ROM deficits, analyze and address strength deficits, analyze and address soft tissue restrictions, analyze and cue for proper movement patterns, analyze and modify for postural abnormalities, analyze and address imbalance/dizziness, and instruct in home and community integration to address functional deficits and attain remaining goals.    Progress toward goals / Updated goals:  []  See Progress Note/Recertification    -Patient will be independent with prescribed HEP           Status at IE : patient reports intermittent compliance sec to wife injury   Current: pt to begin wall slide with eccentric lower at home (2/1/24)   -Patient to restore shoulder PROM to WNL  Status at PN: improving but limited    Current: goal met for elevation, limited ER PROM 42 degrees @ 60 deg ABD (1/30/24)   -Patient to restore shoulder AROM to WNL and pain free   Status at IE: flexion 88, scaption 90, ER 30, FIR T9; AAROM flexion 157 deg, ER 45 deg (1/11/24)  Current: goal progressing; 112 deg flexion, abduction 80 degrees, ER 20 deg, FIR T9 (2/1/24)  - Patient will be able to don/doff shirt and perform bathing ADLs without pain/difficulty  Status at IE: modifications still needed for hair or any reaching over shoulder height  - Patient to restore functional reach and be able to manage getting plate/cup in and out of overhead cupboard to restore independence   Status at IE: unable   Current: unable to perform overhead activities; unable (1/23/24)  - Patient will return

## 2024-02-09 ENCOUNTER — HOSPITAL ENCOUNTER (OUTPATIENT)
Facility: HOSPITAL | Age: 54
Setting detail: RECURRING SERIES
Discharge: HOME | End: 2024-02-12
Payer: COMMERCIAL

## 2024-02-09 PROCEDURE — 97112 NEUROMUSCULAR REEDUCATION: CPT

## 2024-02-09 PROCEDURE — 97110 THERAPEUTIC EXERCISES: CPT

## 2024-02-09 PROCEDURE — 97140 MANUAL THERAPY 1/> REGIONS: CPT

## 2024-02-09 NOTE — PROGRESS NOTES
(timed):  decrease pain, increase ROM, increase tissue extensibility, decrease trigger points, and increase postural awareness to improve patient's ability to progress to PLOF and address remaining functional goals.  The manual therapy interventions were performed at a separate and distinct time from the therapeutic activities interventions . (see flow sheet as applicable)     Details if applicable:  grade II/III inferior/posterior mobilizations; manual shoulder flexion/ER stretching with distraction applied throughout       [x]  Patient Education billed concurrently with other procedures   [x] Review HEP    [] Progressed/Changed HEP, detail:    [] Other detail:       Objective Information/Functional Measures/Assessment    - Pt reports recent functional improvement that he is able to use his left arm to reach to shoulder height with some bracing below for support to perform eye exams on his pateints  - Held majority of overhead flexion interventions due to inc soreness since last session.  Shoulder AROM/PROM:                                        AAROM (deg)          Right Left   Flexion  165   Extension     Scaption  160   ER at 45 Deg ABD  70   IR at 45 Deg ABD  46   Seated Flexion          Patient will continue to benefit from skilled PT / OT services to modify and progress therapeutic interventions, analyze and address functional mobility deficits, analyze and address ROM deficits, analyze and address strength deficits, analyze and address soft tissue restrictions, analyze and cue for proper movement patterns, analyze and modify for postural abnormalities, analyze and address imbalance/dizziness, and instruct in home and community integration to address functional deficits and attain remaining goals.    Progress toward goals / Updated goals:  []  See Progress Note/Recertification    -Patient will be independent with prescribed HEP           Status at IE : patient reports intermittent compliance sec to wife

## 2024-02-12 ENCOUNTER — HOSPITAL ENCOUNTER (OUTPATIENT)
Facility: HOSPITAL | Age: 54
Setting detail: RECURRING SERIES
Discharge: HOME | End: 2024-02-15
Payer: COMMERCIAL

## 2024-02-12 PROCEDURE — 97112 NEUROMUSCULAR REEDUCATION: CPT

## 2024-02-12 PROCEDURE — 97110 THERAPEUTIC EXERCISES: CPT

## 2024-02-12 PROCEDURE — 97140 MANUAL THERAPY 1/> REGIONS: CPT

## 2024-02-12 NOTE — PROGRESS NOTES
PHYSICAL / OCCUPATIONAL THERAPY - DAILY TREATMENT NOTE (updated )    Patient Name: Octavio Medina    Date: 2024    : 1970  Insurance: Payor: IZAARA / Plan: [employer] PLUS PPO / Product Type: *No Product type* /      Patient  verified yes     Visit #   Current / Total 7 10 Total Time   Time   In / Out 5:40 6:30 50   Pain   In / Out 1 0    Subjective Functional Status/Changes: Reported Improvement: 60%  Pain:    Average 2/10              Best 0/10              Worst 8/10  Reported Functional Deficits: anything overhead, work activities - laser treatments, holding lens, tying surgical mask,  rec activities - biking, kayaking    Reported Functional Improvements: lifting and reach below shoulder height     TREATMENT AREA =  Left shoulder pain [M25.512]    OBJECTIVE    Modalities Rationale:     decrease pain and decrease edema to improve patient's ability to progress to PLOF and address remaining functional goals.             --  10 min   [x]  Ice     []  Heat    location/position: supine, left shoulder   Skin assessment post-treatment (if applicable):    [x]  intact    []  redness- no adverse reaction                 []redness - adverse reaction:           Therapeutic Procedures:  40  Total  [facility] Totals Reminder: bill using total billable min of TIMED therapeutic procedures (example: do not include dry needle or estim unattended, both untimed codes, in totals to left)  8-22 min = 1 unit; 23-37 min = 2 units; 38-52 min = 3 units; 53-67 min = 4 units; 68-82 min = 5 units   Tx Min  Procedure, Rationale, Specifics   17  64648 Therapeutic Exercise (timed):  increase ROM, strength, coordination, balance, and proprioception to improve patient's ability to progress to PLOF and address remaining functional goals. (see flow sheet as applicable)   Details if applicable:       12  93295 Neuromuscular Re-Education (timed):  improve balance, coordination, kinesthetic sense, posture, core stability and

## 2024-02-15 ENCOUNTER — APPOINTMENT (OUTPATIENT)
Facility: HOSPITAL | Age: 54
End: 2024-02-15
Payer: COMMERCIAL

## 2024-02-19 ENCOUNTER — HOSPITAL ENCOUNTER (OUTPATIENT)
Facility: HOSPITAL | Age: 54
Setting detail: RECURRING SERIES
Discharge: HOME | End: 2024-02-22
Payer: COMMERCIAL

## 2024-02-19 PROCEDURE — 97110 THERAPEUTIC EXERCISES: CPT

## 2024-02-19 PROCEDURE — 97112 NEUROMUSCULAR REEDUCATION: CPT

## 2024-02-19 PROCEDURE — 97140 MANUAL THERAPY 1/> REGIONS: CPT

## 2024-02-19 NOTE — PROGRESS NOTES
PHYSICAL / OCCUPATIONAL THERAPY - DAILY TREATMENT NOTE (updated )    Patient Name: Octavio Medina    Date: 2024    : 1970  Insurance: Payor: MARLA / Plan: [employer] PLUS PPO / Product Type: *No Product type* /      Patient  verified yes     Visit #   Current / Total 1 10 Total Time   Time   In / Out 5:38 6:20 42   Pain   In / Out 0 0    Subjective Functional Status/Changes: \"I was super sick all weekend, so I didn't get any stretching in.\"     TREATMENT AREA =  Left shoulder pain [M25.512]    OBJECTIVE    Modalities Rationale:     decrease pain and decrease edema to improve patient's ability to progress to PLOF and address remaining functional goals.             --  PD min   [x]  Ice     []  Heat    location/position: supine, left shoulder   Skin assessment post-treatment (if applicable):    [x]  intact    []  redness- no adverse reaction                 []redness - adverse reaction:           Therapeutic Procedures:  42  Total  [facility] Totals Reminder: bill using total billable min of TIMED therapeutic procedures (example: do not include dry needle or estim unattended, both untimed codes, in totals to left)  8-22 min = 1 unit; 23-37 min = 2 units; 38-52 min = 3 units; 53-67 min = 4 units; 68-82 min = 5 units   Tx Min  Procedure, Rationale, Specifics   20  28863 Therapeutic Exercise (timed):  increase ROM, strength, coordination, balance, and proprioception to improve patient's ability to progress to PLOF and address remaining functional goals. (see flow sheet as applicable)     Details if applicable:  left shoulder ROM      12  27026 Neuromuscular Re-Education (timed):  improve balance, coordination, kinesthetic sense, posture, core stability and proprioception to improve patient's ability to develop conscious control of individual muscles and awareness of position of extremities in order to progress to PLOF and address remaining functional goals. (see flow sheet as applicable)     Details if

## 2024-02-21 ENCOUNTER — HOSPITAL ENCOUNTER (OUTPATIENT)
Facility: HOSPITAL | Age: 54
Setting detail: RECURRING SERIES
Discharge: HOME | End: 2024-02-24
Payer: COMMERCIAL

## 2024-02-21 PROCEDURE — 97110 THERAPEUTIC EXERCISES: CPT

## 2024-02-21 PROCEDURE — 97112 NEUROMUSCULAR REEDUCATION: CPT

## 2024-02-21 PROCEDURE — 97140 MANUAL THERAPY 1/> REGIONS: CPT

## 2024-02-21 NOTE — PROGRESS NOTES
PHYSICAL / OCCUPATIONAL THERAPY - DAILY TREATMENT NOTE (updated )    Patient Name: Octavio Medina    Date: 2024    : 1970  Insurance: Payor: IZAARA / Plan: S[employer] PLUS PPO / Product Type: *No Product type* /      Patient  verified yes     Visit #   Current / Total 2 10 Total Time   Time   In / Out 6:00 6:56 56   Pain   In / Out 0 0    Subjective Functional Status/Changes: \"I'm a little stiff today for a longer work day.\"     TREATMENT AREA =  Left shoulder pain [M25.512]    OBJECTIVE    Modalities Rationale:     decrease pain and decrease edema to improve patient's ability to progress to PLOF and address remaining functional goals.             --  10 min   [x]  Ice     []  Heat    location/position: supine, left shoulder   Skin assessment post-treatment (if applicable):    [x]  intact    []  redness- no adverse reaction                 []redness - adverse reaction:           Therapeutic Procedures:  46  Total  [facility] Totals Reminder: bill using total billable min of TIMED therapeutic procedures (example: do not include dry needle or estim unattended, both untimed codes, in totals to left)  8-22 min = 1 unit; 23-37 min = 2 units; 38-52 min = 3 units; 53-67 min = 4 units; 68-82 min = 5 units   Tx Min  Procedure, Rationale, Specifics   23  95003 Therapeutic Exercise (timed):  increase ROM, strength, coordination, balance, and proprioception to improve patient's ability to progress to PLOF and address remaining functional goals. (see flow sheet as applicable)     Details if applicable:  left shoulder ROM      8  09634 Neuromuscular Re-Education (timed):  improve balance, coordination, kinesthetic sense, posture, core stability and proprioception to improve patient's ability to develop conscious control of individual muscles and awareness of position of extremities in order to progress to PLOF and address remaining functional goals. (see flow sheet as applicable)     Details if applicable: left

## 2024-02-24 LAB
A/G RATIO: 1.8 RATIO (ref 1.1–2.6)
ALBUMIN SERPL-MCNC: 4.6 G/DL (ref 3.5–5)
ALP BLD-CCNC: 70 U/L (ref 25–115)
ALT SERPL-CCNC: 44 U/L (ref 5–40)
ANION GAP SERPL CALCULATED.3IONS-SCNC: 8 MMOL/L (ref 3–15)
AST SERPL-CCNC: 29 U/L (ref 10–37)
BACTERIA: NEGATIVE
BASOPHILS # BLD: 0 % (ref 0–2)
BASOPHILS ABSOLUTE: 0 K/UL (ref 0–0.2)
BILIRUB SERPL-MCNC: 0.3 MG/DL (ref 0.2–1.2)
BILIRUB SERPL-MCNC: NEGATIVE MG/DL
BLOOD: NEGATIVE
BUN BLDV-MCNC: 18 MG/DL (ref 6–22)
CALCIUM SERPL-MCNC: 9.4 MG/DL (ref 8.4–10.5)
CHLORIDE BLD-SCNC: 102 MMOL/L (ref 98–110)
CHOLESTEROL/HDL RATIO: 6.7 (ref 0–5)
CHOLESTEROL: 235 MG/DL (ref 110–200)
CLARITY: CLEAR
CO2: 29 MMOL/L (ref 20–32)
COLOR: YELLOW
CREAT SERPL-MCNC: 0.8 MG/DL (ref 0.5–1.2)
EOSINOPHIL # BLD: 2 % (ref 0–6)
EOSINOPHILS ABSOLUTE: 0.1 K/UL (ref 0–0.5)
EPITHELIAL CELLS: NORMAL /HPF
GLOBULIN: 2.6 G/DL (ref 2–4)
GLOMERULAR FILTRATION RATE: >60 ML/MIN/1.73 SQ.M.
GLUCOSE: 102 MG/DL (ref 70–99)
GLUCOSE: NEGATIVE MG/DL
HCT VFR BLD CALC: 44.5 % (ref 39.3–51.6)
HDLC SERPL-MCNC: 35 MG/DL
HEMOGLOBIN: 15.1 G/DL (ref 13.1–17.2)
HYALINE CASTS: NORMAL /LPF (ref 0–2)
KETONES, URINE: NEGATIVE MG/DL
LDL CHOLESTEROL CALCULATED: 166 MG/DL (ref 50–99)
LDL/HDL RATIO: 4.8
LEUKOCYTE ESTERASE, URINE: NEGATIVE
LYMPHOCYTES # BLD: 38 % (ref 20–45)
LYMPHOCYTES ABSOLUTE: 1.7 K/UL (ref 1–4.8)
MCH RBC QN AUTO: 29 PG (ref 26–34)
MCHC RBC AUTO-ENTMCNC: 34 G/DL (ref 31–36)
MCV RBC AUTO: 86 FL (ref 80–95)
MONOCYTES ABSOLUTE: 0.4 K/UL (ref 0.1–1)
MONOCYTES: 9 % (ref 3–12)
NEUTROPHILS ABSOLUTE: 2.3 K/UL (ref 1.8–7.7)
NEUTROPHILS: 51 % (ref 40–75)
NITRITE, URINE: NEGATIVE
NON-HDL CHOLESTEROL: 200 MG/DL
PDW BLD-RTO: 12.8 % (ref 10–15.5)
PH, URINE: 5.5 PH (ref 5–8)
PLATELET # BLD: 211 K/UL (ref 140–440)
PMV BLD AUTO: 11.8 FL (ref 9–13)
POTASSIUM SERPL-SCNC: 4.8 MMOL/L (ref 3.5–5.5)
PROSTATE SPECIFIC ANTIGEN: 4.05 NG/ML
PROTEIN UA: NEGATIVE MG/DL
RBC URINE: NORMAL /HPF
RBC: 5.19 M/UL (ref 3.8–5.8)
SODIUM BLD-SCNC: 139 MMOL/L (ref 133–145)
SPECIFIC GRAVITY: 1.02 (ref 1–1.03)
TOTAL PROTEIN: 7.2 G/DL (ref 6.4–8.3)
TRIGL SERPL-MCNC: 167 MG/DL (ref 40–149)
UROBILINOGEN: 0.2 MG/DL
VLDLC SERPL CALC-MCNC: 33 MG/DL (ref 8–30)
WBC UA: NORMAL /HPF (ref 0–2)
WBC: 4.5 K/UL (ref 4–11)

## 2024-02-28 ENCOUNTER — HOSPITAL ENCOUNTER (OUTPATIENT)
Facility: HOSPITAL | Age: 54
Setting detail: RECURRING SERIES
Discharge: HOME | End: 2024-03-02
Payer: COMMERCIAL

## 2024-02-28 PROCEDURE — 97110 THERAPEUTIC EXERCISES: CPT

## 2024-02-28 PROCEDURE — 97112 NEUROMUSCULAR REEDUCATION: CPT

## 2024-02-28 PROCEDURE — 97140 MANUAL THERAPY 1/> REGIONS: CPT

## 2024-02-28 NOTE — PROGRESS NOTES
and perform bathing ADLs without pain/difficulty  Status at PN: modifications still needed for hair or any reaching over shoulder  - Patient to restore functional reach and be able to manage getting plate/cup in and out of overhead cupboard to restore independence   Status at PN: unable to perform overhead activities  - Patient will return to biking without increased pain   Status at PN: restricted based on home life  - Patient to restore UE strength to at least 4+/5 in all appropriate muscle groups for restoration of functional tasks  Status at PN: 4-/5 in available range    Auth 15 visits expires 6/14/24  PN Due 3/11/24    PLAN  [x] Continue plan of care  [x]  Upgrade activities as tolerated  []  Discharge due to :  []  Other:    Chris Lu, LUKE    2/28/2024    3:47 PM    Future Appointments   Date Time Provider Department Center   2/29/2024  3:40 PM Angeli Jorge MD [facility]

## 2024-02-28 NOTE — PROGRESS NOTES
Octavio Medina presents today for 1 year appointment              1. \"Have you been to the ER, urgent care clinic since your last visit?  Hospitalized since your last visit?\" no    2. \"Have you seen or consulted any other health care providers outside of the [facility] since your last visit?\" no     3. For patients aged 45-75: Has the patient had a colonoscopy / FIT/ Cologuard? Yes - no Care Gap present      If the patient is female:    4. For patients aged 40-74: Has the patient had a mammogram within the past 2 years? NA - based on age or sex      5. For patients aged 21-65: Has the patient had a pap smear? NA - based on age or sex

## 2024-02-29 ENCOUNTER — OFFICE VISIT (OUTPATIENT)
Age: 54
End: 2024-02-29
Payer: COMMERCIAL

## 2024-02-29 VITALS
RESPIRATION RATE: 18 BRPM | BODY MASS INDEX: 28.56 KG/M2 | HEIGHT: 71 IN | HEART RATE: 75 BPM | TEMPERATURE: 98.3 F | WEIGHT: 204 LBS | DIASTOLIC BLOOD PRESSURE: 63 MMHG | SYSTOLIC BLOOD PRESSURE: 110 MMHG | OXYGEN SATURATION: 98 %

## 2024-02-29 DIAGNOSIS — E78.5 DYSLIPIDEMIA: ICD-10-CM

## 2024-02-29 DIAGNOSIS — R97.20 ELEVATED PSA: ICD-10-CM

## 2024-02-29 DIAGNOSIS — Z00.00 ROUTINE PHYSICAL EXAMINATION: Primary | ICD-10-CM

## 2024-02-29 DIAGNOSIS — R74.01 ELEVATED ALT MEASUREMENT: ICD-10-CM

## 2024-02-29 LAB
HEMOCCULT STL QL: NORMAL
VALID INTERNAL CONTROL: YES

## 2024-02-29 PROCEDURE — 82272 OCCULT BLD FECES 1-3 TESTS: CPT | Performed by: INTERNAL MEDICINE

## 2024-02-29 PROCEDURE — 99396 PREV VISIT EST AGE 40-64: CPT | Performed by: INTERNAL MEDICINE

## 2024-02-29 SDOH — ECONOMIC STABILITY: FOOD INSECURITY: WITHIN THE PAST 12 MONTHS, YOU WORRIED THAT YOUR FOOD WOULD RUN OUT BEFORE YOU GOT MONEY TO BUY MORE.: NEVER TRUE

## 2024-02-29 SDOH — ECONOMIC STABILITY: FOOD INSECURITY: WITHIN THE PAST 12 MONTHS, THE FOOD YOU BOUGHT JUST DIDN'T LAST AND YOU DIDN'T HAVE MONEY TO GET MORE.: NEVER TRUE

## 2024-02-29 SDOH — ECONOMIC STABILITY: HOUSING INSECURITY
IN THE LAST 12 MONTHS, WAS THERE A TIME WHEN YOU DID NOT HAVE A STEADY PLACE TO SLEEP OR SLEPT IN A SHELTER (INCLUDING NOW)?: NO

## 2024-02-29 SDOH — ECONOMIC STABILITY: INCOME INSECURITY: HOW HARD IS IT FOR YOU TO PAY FOR THE VERY BASICS LIKE FOOD, HOUSING, MEDICAL CARE, AND HEATING?: NOT HARD AT ALL

## 2024-02-29 SDOH — ECONOMIC STABILITY: TRANSPORTATION INSECURITY
IN THE PAST 12 MONTHS, HAS LACK OF TRANSPORTATION KEPT YOU FROM MEETINGS, WORK, OR FROM GETTING THINGS NEEDED FOR DAILY LIVING?: NO

## 2024-02-29 ASSESSMENT — PATIENT HEALTH QUESTIONNAIRE - PHQ9
1. LITTLE INTEREST OR PLEASURE IN DOING THINGS: 0
2. FEELING DOWN, DEPRESSED OR HOPELESS: 0
SUM OF ALL RESPONSES TO PHQ QUESTIONS 1-9: 0
SUM OF ALL RESPONSES TO PHQ9 QUESTIONS 1 & 2: 0
SUM OF ALL RESPONSES TO PHQ QUESTIONS 1-9: 0

## 2024-02-29 ASSESSMENT — ENCOUNTER SYMPTOMS
BLOOD IN STOOL: 0
SHORTNESS OF BREATH: 0

## 2024-03-01 NOTE — PROGRESS NOTES
HPI     Octavio Medina is here for routine physical exam.    His PSA is slightly elevated, and I will refer him to see urology.    He has chronic stress and depression, feels he is overall coping with it.    His lipid panel has improved with his total cholesterol and LDL dropping significantly since June, though his triglycerides are slightly elevated at 167, HDL low at 35.  He is losing weight doing intermittent fasting, and I encouraged him to continue that.  Past Medical History:   Diagnosis Date    Depression     Dyslipidemia     History of echocardiogram 8/17/04    Normal. EF 60-65%.     Normal cardiac stress test 8/17/04    Maximal stress negative for ischemia. Ex time: 14 mins    Rhabdomyolysis     recurrent, idiopathic    Sleep apnea     Sterilization         Past Surgical History:   Procedure Laterality Date    ANESTH,SURGERY OF SHOULDER Right 2018    rotator cuff repair    VASECTOMY  1/30/2015        Current Outpatient Medications   Medication Sig Dispense Refill    escitalopram (LEXAPRO) 20 MG tablet Take 1 tablet by mouth daily 90 tablet 3    EPINEPHrine (ADRENACLICK) 0.15 MG/0.15ML SOAJ injection Inject 0.15 mLs into the muscle once as needed      ibuprofen (ADVIL;MOTRIN) 200 MG tablet Take 1 tablet by mouth every 6 hours as needed       No current facility-administered medications for this visit.        Allergies   Allergen Reactions    Crab Extract Allergy Skin Test Hives    Simvastatin Other (See Comments)     rhabdomyolysis        Social History     Socioeconomic History    Marital status:      Spouse name: Not on file    Number of children: Not on file    Years of education: Not on file    Highest education level: Not on file   Occupational History    Not on file   Tobacco Use    Smoking status: Never    Smokeless tobacco: Never   Substance and Sexual Activity    Alcohol use: Yes    Drug use: No    Sexual activity: Not on file   Other Topics Concern    Not on file   Social History

## 2024-03-05 DIAGNOSIS — S46.012D TRAUMATIC COMPLETE TEAR OF LEFT ROTATOR CUFF, SUBSEQUENT ENCOUNTER: Primary | ICD-10-CM

## 2024-03-19 ENCOUNTER — HOSPITAL ENCOUNTER (OUTPATIENT)
Facility: HOSPITAL | Age: 54
Discharge: HOME | End: 2024-03-22
Attending: ORTHOPAEDIC SURGERY
Payer: COMMERCIAL

## 2024-03-19 DIAGNOSIS — S46.012D TRAUMATIC COMPLETE TEAR OF LEFT ROTATOR CUFF, SUBSEQUENT ENCOUNTER: ICD-10-CM

## 2024-03-19 PROCEDURE — 73221 MRI JOINT UPR EXTREM W/O DYE: CPT

## 2024-03-20 ENCOUNTER — HOSPITAL ENCOUNTER (OUTPATIENT)
Facility: HOSPITAL | Age: 54
Setting detail: RECURRING SERIES
Discharge: HOME | End: 2024-03-23
Payer: COMMERCIAL

## 2024-03-20 PROBLEM — F33.9 RECURRENT MAJOR DEPRESSIVE DISORDER (HCC): Status: ACTIVE | Noted: 2024-03-20

## 2024-03-20 PROBLEM — R74.01 TRANSAMINITIS: Status: ACTIVE | Noted: 2024-03-20

## 2024-03-20 PROCEDURE — 97112 NEUROMUSCULAR REEDUCATION: CPT

## 2024-03-20 PROCEDURE — 97110 THERAPEUTIC EXERCISES: CPT

## 2024-03-20 PROCEDURE — 97140 MANUAL THERAPY 1/> REGIONS: CPT

## 2024-03-20 NOTE — PROGRESS NOTES
PHYSICAL / OCCUPATIONAL THERAPY - DAILY TREATMENT NOTE (updated )    Patient Name: Octavio Medina    Date: 3/20/2024    : 1970  Insurance: Payor: IZAARA / Plan: S[employer] PLUS PPO / Product Type: *No Product type* /      Patient  verified yes     Visit #   Current / Total 4 10 Total Time   Time   In / Out 6:04 pm 6:40 pm 36   Pain   In / Out 0/10 0/10    Subjective Functional Status/Changes: \"I can lift it about an inch higher.\" Patient notes recent MRI last night, awaiting MD call or follow-up at this time.     TREATMENT AREA =  Left shoulder pain [M25.512]    OBJECTIVE  PD modalities.    Therapeutic Procedures:  36  Total [facility] Totals Reminder: bill using total billable min of TIMED therapeutic procedures (example: do not include dry needle or estim unattended, both untimed codes, in totals to left)  8-22 min = 1 unit; 23-37 min = 2 units; 38-52 min = 3 units; 53-67 min = 4 units; 68-82 min = 5 units   Tx Min Procedure, Rationale, Specifics   10 37515 Therapeutic Exercise (timed):  increase ROM, strength, coordination, balance, and proprioception to improve patient's ability to progress to PLOF and address remaining functional goals. (see flow sheet as applicable)     Details if applicable:  left shoulder ROM, reassessment     12 95790 Neuromuscular Re-Education (timed):  improve balance, coordination, kinesthetic sense, posture, core stability and proprioception to improve patient's ability to develop conscious control of individual muscles and awareness of position of extremities in order to progress to PLOF and address remaining functional goals. (see flow sheet as applicable)     Details if applicable: left shoulder rotator cuff and scapular re-ed       14 94389 Manual Therapy (timed):  decrease pain, increase ROM, increase tissue extensibility, decrease trigger points, and increase postural awareness to improve patient's ability to progress to PLOF and address remaining functional goals.

## 2024-03-25 ENCOUNTER — HOSPITAL ENCOUNTER (OUTPATIENT)
Facility: HOSPITAL | Age: 54
Setting detail: RECURRING SERIES
Discharge: HOME | End: 2024-03-28
Payer: COMMERCIAL

## 2024-03-25 PROCEDURE — 97140 MANUAL THERAPY 1/> REGIONS: CPT

## 2024-03-25 PROCEDURE — 97110 THERAPEUTIC EXERCISES: CPT

## 2024-03-25 PROCEDURE — 97112 NEUROMUSCULAR REEDUCATION: CPT

## 2024-03-29 NOTE — PROGRESS NOTES
PHYSICAL / OCCUPATIONAL THERAPY - DAILY TREATMENT NOTE (updated )    Patient Name: Octavio Medina    Date: 3/25/2024    : 1970  Insurance: Payor: IZAARA / Plan: S[employer]ARA PLUS PPO / Product Type: *No Product type* /      Patient  verified yes     Visit #   Current / Total 5 10 Total Time   Time   In / Out 5:40 pm 6:29 pm 49   Pain   In / Out 0/10 0/10    Subjective Functional Status/Changes: Patient notes reading his MRI results online; is awaiting MD for further recommendation.     TREATMENT AREA =  Left shoulder pain [M25.512]    OBJECTIVE  PD modalities.    Therapeutic Procedures:  49  Total  [facility] Totals Reminder: bill using total billable min of TIMED therapeutic procedures (example: do not include dry needle or estim unattended, both untimed codes, in totals to left)  8-22 min = 1 unit; 23-37 min = 2 units; 38-52 min = 3 units; 53-67 min = 4 units; 68-82 min = 5 units   Tx Min Procedure, Rationale, Specifics   21 21762 Therapeutic Exercise (timed):  increase ROM, strength, coordination, balance, and proprioception to improve patient's ability to progress to PLOF and address remaining functional goals. (see flow sheet as applicable)     Details if applicable:  left shoulder ROM, GHJ self-mobs, stretching     10 31269 Neuromuscular Re-Education (timed):  improve balance, coordination, kinesthetic sense, posture, core stability and proprioception to improve patient's ability to develop conscious control of individual muscles and awareness of position of extremities in order to progress to PLOF and address remaining functional goals. (see flow sheet as applicable)     Details if applicable: left shoulder rotator cuff and scapular re-ed       18 00717 Manual Therapy (timed):  decrease pain, increase ROM, increase tissue extensibility, decrease trigger points, and increase postural awareness to improve patient's ability to progress to PLOF and address remaining functional goals.  The manual therapy 
flex, abd 4/5 in available range, ER 4-/5 in available ROM at end-range, 4/5 in neutral (0 degrees of ER)        Patient demonstrates palpable muscular recruitment of the shoulder external rotators in prone with all ER attempts; demonstrates ER active ROM equal to passive ROM.       Progress toward goals / Updated goals:   -Patient will be independent with prescribed HEP           Status at last PN: intermittent compliance due to work schedule  Current: goal progressing; intermittent compliance   -Patient to restore shoulder PROM to WNL  Status at last PN: flex 131 deg, abd 120 deg, ER 54 deg with pain at end range all planes  Current: goal progressing; flexion 155 deg, abd 135 deg, ER 65 deg with end-range pain in all planes   -Patient to restore shoulder AROM to WNL and pain free   Status at last PN: flex 86 deg, abd 103 deg, ER 42 deg, IR to T10 with pain at end range all planes  Current: goal progressing; flexion 87 deg, abd 80 deg, ER 45 deg, IR T10  - Patient will be able to don/doff shirt and perform bathing ADLs without pain/difficulty  Status at last PN: modifications still needed for hair or any reaching over shoulder  Current: goal progressing; cont's to require modifications  - Patient to restore functional reach and be able to manage getting plate/cup in and out of overhead cupboard to restore independence   Status at last PN: unable to perform overhead activities  Current: goal progressing; unable to perform overhead activities  - Patient will return to biking without increased pain   Status at last PN: restricted based on home life  Current: goal not met; restricted at this time due to busy schedule  - Patient to restore UE strength to at least 4+/5 in all appropriate muscle groups for restoration of functional tasks  Status at last PN: 4-/5 in available range  Current: goal progressing; generally 4-/5 to 4/5 within available ROM      Non-Medicare, can change goals, can adjust or add frequency

## 2024-04-01 ENCOUNTER — HOSPITAL ENCOUNTER (OUTPATIENT)
Facility: HOSPITAL | Age: 54
Setting detail: RECURRING SERIES
Discharge: HOME | End: 2024-04-04
Payer: COMMERCIAL

## 2024-04-01 PROCEDURE — 97110 THERAPEUTIC EXERCISES: CPT

## 2024-04-01 PROCEDURE — 97112 NEUROMUSCULAR REEDUCATION: CPT

## 2024-04-01 NOTE — PROGRESS NOTES
PHYSICAL / OCCUPATIONAL THERAPY - DAILY TREATMENT NOTE (updated )    Patient Name: Octavio Medina    Date: 2024    : 1970  Insurance: Payor: SENTARA / Plan: S[employer] PLUS PPO / Product Type: *No Product type* /      Patient  verified yes     Visit #   Current / Total 1 10 Total Time   Time   In / Out 7:01 7:42 41   Pain   In / Out 0 0    Subjective Functional Status/Changes: My surgeon thinks the MRI results are not quite accurate and that what they think is the healed tendon is really just scar tissue where the tendon should be.     TREATMENT AREA =  Left shoulder pain [M25.512]    OBJECTIVE    Therapeutic Procedures:  41  Total  [facility] Totals Reminder: bill using total billable min of TIMED therapeutic procedures (example: do not include dry needle or estim unattended, both untimed codes, in totals to left)  8-22 min = 1 unit; 23-37 min = 2 units; 38-52 min = 3 units; 53-67 min = 4 units; 68-82 min = 5 units   Tx Min  Procedure, Rationale, Specifics   12  14743 Therapeutic Exercise (timed):  increase ROM, strength, coordination, balance, and proprioception to improve patient's ability to progress to PLOF and address remaining functional goals. (see flow sheet as applicable)   Details if applicable:       26  13280 Neuromuscular Re-Education (timed):  improve balance, coordination, kinesthetic sense, posture, core stability and proprioception to improve patient's ability to develop conscious control of individual muscles and awareness of position of extremities in order to progress to PLOF and address remaining functional goals. (see flow sheet as applicable)     Details if applicable:       3  29419 Manual Therapy (timed):  decrease pain, increase ROM, increase tissue extensibility, decrease trigger points, and increase postural awareness to improve patient's ability to progress to PLOF and address remaining functional goals.  The manual therapy interventions were performed at a separate and

## 2024-04-21 DIAGNOSIS — S46.012D TRAUMATIC COMPLETE TEAR OF LEFT ROTATOR CUFF, SUBSEQUENT ENCOUNTER: Primary | ICD-10-CM

## 2024-04-25 DIAGNOSIS — S46.012D TRAUMATIC COMPLETE TEAR OF LEFT ROTATOR CUFF, SUBSEQUENT ENCOUNTER: Primary | ICD-10-CM

## 2024-07-11 LAB
ALBUMIN SERPL-MCNC: 4.5 G/DL (ref 3.8–4.9)
ALP SERPL-CCNC: 65 IU/L (ref 44–121)
ALT SERPL-CCNC: 30 IU/L (ref 0–44)
AST SERPL-CCNC: 27 IU/L (ref 0–40)
BILIRUB DIRECT SERPL-MCNC: <0.1 MG/DL (ref 0–0.4)
BILIRUB SERPL-MCNC: 0.3 MG/DL (ref 0–1.2)
PROT SERPL-MCNC: 6.9 G/DL (ref 6–8.5)
SPECIMEN STATUS REPORT: NORMAL

## 2024-08-02 ENCOUNTER — OFFICE VISIT (OUTPATIENT)
Facility: CLINIC | Age: 54
End: 2024-08-02
Payer: COMMERCIAL

## 2024-08-02 VITALS
TEMPERATURE: 98.4 F | WEIGHT: 208 LBS | HEIGHT: 71 IN | OXYGEN SATURATION: 98 % | RESPIRATION RATE: 20 BRPM | SYSTOLIC BLOOD PRESSURE: 120 MMHG | BODY MASS INDEX: 29.12 KG/M2 | DIASTOLIC BLOOD PRESSURE: 78 MMHG | HEART RATE: 70 BPM

## 2024-08-02 DIAGNOSIS — Z01.818 PRE-OP EVALUATION: Primary | ICD-10-CM

## 2024-08-02 PROCEDURE — 99213 OFFICE O/P EST LOW 20 MIN: CPT | Performed by: INTERNAL MEDICINE

## 2024-08-02 NOTE — PROGRESS NOTES
Octavio Medina presents today for   Chief Complaint   Patient presents with    Pre-op Exam     Left shoulder surgery   August 16, 2024   MD Alda           \"Have you been to the ER, urgent care clinic since your last visit?  Hospitalized since your last visit?\"    NO    “Have you seen or consulted any other health care providers outside of [facility] since your last visit?”    Yes, Ortho in [place].

## 2024-08-02 NOTE — PROGRESS NOTES
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    Denies signs or symptoms of infection, personal or family history of postoperative DVT or PE, and personal or family history of malignant hyperthermia.    Past surgeries: R rotator cuff repair, vasectomy-- no perioperative complications reported.      No easy bleeding or bruising, does not take any potential anticoagulants, including NSAIDs, fish oil, vitamin E.    Past Medical History:   Diagnosis Date    Depression     Dyslipidemia     History of echocardiogram 8/17/04    Normal. EF 60-65%.     Normal cardiac stress test 8/17/04    Maximal stress negative for ischemia. Ex time: 14 mins    Rhabdomyolysis     recurrent, idiopathic    Sleep apnea     Sterilization         Past Surgical History:   Procedure Laterality Date    ANESTH,SURGERY OF SHOULDER Right 2018    rotator cuff repair    VASECTOMY  1/30/2015        Current Outpatient Medications   Medication Sig Dispense Refill    escitalopram (LEXAPRO) 20 MG tablet Take 1 tablet by mouth daily 90 tablet 3    EPINEPHrine (ADRENACLICK) 0.15 MG/0.15ML SOAJ injection Inject 0.15 mLs into the muscle once as needed      ibuprofen (ADVIL;MOTRIN) 200 MG tablet Take 1 tablet by mouth every 6 hours as needed       No current facility-administered medications for this visit.        Allergies   Allergen Reactions    Crab Extract Hives    Simvastatin Other (See Comments)     rhabdomyolysis        Social History     Socioeconomic History    Marital status:      Spouse name: Not on file    Number of children: Not on file    Years of education: Not on file    Highest education level: Not on file   Occupational History    Not on file   Tobacco Use    Smoking status: Never    Smokeless tobacco: Never   Vaping Use    Vaping Use: Never used   Substance and Sexual Activity    Alcohol use: Yes    Drug use: No    Sexual activity: Not on file   Other Topics Concern

## 2024-08-09 ENCOUNTER — HOSPITAL ENCOUNTER (OUTPATIENT)
Facility: HOSPITAL | Age: 54
Discharge: HOME | End: 2024-08-12

## 2024-08-09 ENCOUNTER — TELEPHONE (OUTPATIENT)
Facility: CLINIC | Age: 54
End: 2024-08-09

## 2024-08-09 LAB — SENTARA SPECIMEN COLLECTION: NORMAL

## 2024-08-09 PROCEDURE — 99001 SPECIMEN HANDLING PT-LAB: CPT

## 2024-08-09 NOTE — TELEPHONE ENCOUNTER
Celeste from [facility] r anaesthesia pre testing called   She need pt's pre op office notes , labs results , EKG and any xrays that were done to be faxed     Phone# [phone]  Fax# [phone]

## 2024-08-10 LAB
A/G RATIO: 1.9 RATIO (ref 1.1–2.6)
ALBUMIN: 4.4 G/DL (ref 3.5–5)
ALP BLD-CCNC: 64 U/L (ref 25–115)
ALT SERPL-CCNC: 22 U/L (ref 5–40)
ANION GAP SERPL CALCULATED.3IONS-SCNC: 10 MMOL/L (ref 3–15)
AST SERPL-CCNC: 20 U/L (ref 10–37)
BASOPHILS ABSOLUTE: 0 K/UL (ref 0–0.2)
BASOPHILS RELATIVE PERCENT: 1 % (ref 0–2)
BILIRUB SERPL-MCNC: 0.3 MG/DL (ref 0.2–1.2)
BUN BLDV-MCNC: 13 MG/DL (ref 6–22)
CALCIUM SERPL-MCNC: 9.3 MG/DL (ref 8.4–10.5)
CHLORIDE BLD-SCNC: 104 MMOL/L (ref 98–110)
CO2: 29 MMOL/L (ref 20–32)
CREAT SERPL-MCNC: 0.9 MG/DL (ref 0.5–1.2)
EOSINOPHIL # BLD: 2 % (ref 0–6)
EOSINOPHILS ABSOLUTE: 0.1 K/UL (ref 0–0.5)
GFR, ESTIMATED: >60 ML/MIN/1.73 SQ.M.
GLOBULIN: 2.3 G/DL (ref 2–4)
GLUCOSE: 104 MG/DL (ref 70–99)
HCT VFR BLD CALC: 48.6 % (ref 39.3–51.6)
HEMOGLOBIN: 14.9 G/DL (ref 13.1–17.2)
LYMPHOCYTES # BLD: 34 % (ref 20–45)
LYMPHOCYTES ABSOLUTE: 1.8 K/UL (ref 1–4.8)
MCH RBC QN AUTO: 29 PG (ref 26–34)
MCHC RBC AUTO-ENTMCNC: 31 G/DL (ref 31–36)
MCV RBC AUTO: 94 FL (ref 80–95)
MONOCYTES ABSOLUTE: 0.5 K/UL (ref 0.1–1)
NEUTROPHILS ABSOLUTE: 2.9 K/UL (ref 1.8–7.7)
NEUTROPHILS: 55 % (ref 40–75)
PDW BLD-RTO: 13.9 % (ref 10–15.5)
PLATELET # BLD: 146 K/UL (ref 140–440)
PMV BLD AUTO: 13.6 FL (ref 9–13)
POTASSIUM SERPL-SCNC: 4.9 MMOL/L (ref 3.5–5.5)
RBC # BLD: 5.19 M/UL (ref 3.8–5.8)
SODIUM BLD-SCNC: 143 MMOL/L (ref 133–145)
TOTAL PROTEIN: 6.7 G/DL (ref 6.4–8.3)
WBC # BLD: 5.3 K/UL (ref 4–11)

## 2024-10-03 ENCOUNTER — HOSPITAL ENCOUNTER (OUTPATIENT)
Facility: HOSPITAL | Age: 54
Setting detail: RECURRING SERIES
Discharge: HOME | End: 2024-10-06
Payer: COMMERCIAL

## 2024-10-03 PROCEDURE — 97162 PT EVAL MOD COMPLEX 30 MIN: CPT
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
## 2024-10-03 NOTE — PROGRESS NOTES
[facility]  [address] [facility] [address] [place] [address] Ph:[phone] Fx: [phone]    PLAN OF CARE/ Statement of Necessity for Physical Therapy Services           Patient name: Octavio Medina Start of Care: 10/3/2024   Referral source: Yong Chaparro MD : 1970    Medical Diagnosis: Left shoulder pain [M25.512]       Onset Date: 2024   Treatment Diagnosis: M25.512  LEFT SHOULDER PAIN                                     Prior Hospitalization: see medical history Provider#: [id number]   Medications: Verified on Patient Summary List     Comorbidities: Complications related to surgery: Patient had failed RCR on left shoulder in . Recent surgery performed with cadaver achilles tendon transfer to left lower trapezius.    Prior Level of Function: functionally independent, no AD, active lifestyle. 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     The Plan of Care and following information is based on the information from the initial evaluation.    Assessment / key information:  Patient is a 53 yo male who presents to [facility] with c/o Shoulder pain. Patient is s/p lower trapezius tendon transfer on 2024 due to RCR failure that was attempted last year. Patient reports minimal pain at this time, but he is unable to actively move his arm per protocol. Patient presents with protocol from surgeon. His right Ue is WNL for mobility and strength. He is unable to perform AROM per protocol, but able to achieve fairly good PROM. GHJ mobility is quite good. Patient demonstrates decreased ROM, decreased strength, impaired posture, pain and decreased functional mobility tolerance     Evaluation Complexity:  History:  MEDIUM  Complexity : 1-2 comorbidities / personal factors will impact the outcome/ POC ; Examination:  LOW Complexity : 1-2 Standardized tests and

## 2024-10-10 ENCOUNTER — TELEPHONE (OUTPATIENT)
Facility: HOSPITAL | Age: 54
End: 2024-10-10

## 2024-10-10 NOTE — TELEPHONE ENCOUNTER
called to offer pt earlier appt times after recieving auth. patient was rude because we didnt have the times available that he needed. which are 7:10 or 5:10 only. said he will call the [facility]

## 2024-10-17 ENCOUNTER — HOSPITAL ENCOUNTER (OUTPATIENT)
Facility: HOSPITAL | Age: 54
Setting detail: RECURRING SERIES
Discharge: HOME | End: 2024-10-20
Payer: COMMERCIAL

## 2024-10-17 PROCEDURE — 97016 VASOPNEUMATIC DEVICE THERAPY: CPT

## 2024-10-17 PROCEDURE — 97140 MANUAL THERAPY 1/> REGIONS: CPT

## 2024-10-17 PROCEDURE — 97110 THERAPEUTIC EXERCISES: CPT

## 2024-10-17 NOTE — PROGRESS NOTES
PHYSICAL / OCCUPATIONAL THERAPY - DAILY TREATMENT NOTE     Patient Name: Octavio Medina    Date: 10/17/2024    : 1970  Insurance: Payor: S[employer]ARA / Plan: S[employer]ARA PLUS PPO / Product Type: *No Product type* /      Patient  verified Yes     Visit #   Current / Total 2 24   Time   In / Out 7:10 8:01   Pain   In / Out 2/10 1/10   Subjective Functional Status/Changes: The patient reports that his shoulder remains sore and nights are still difficult regarding sleep.   Changes to:  Allergies, Med Hx, Sx Hx?   no       TREATMENT AREA =  Left shoulder pain [M25.512]    If an interpreting service is utilized for treatment of this patient, the contents of this document represent the material reviewed with the patient via the .     OBJECTIVE  Modalities Rationale:     decrease edema, decrease inflammation, and decrease pain to improve patient's ability to progress to PLOF and address remaining functional goals.    10 min [x]  Vasopneumatic Device, press/temp:  LP/LT left shoulder; pt seated   If using vaso (only need to measure limb vaso being performed on)      pre-treatment girth :       post-treatment girth :       measured at (landmark location) :     Skin assessment post-treatment (if applicable):    []  intact    []  redness- no adverse reaction                 []redness - adverse reaction:         Therapeutic Procedures:  Tx Min Billable or 1:1 Min (if diff from Tx Min) Procedure, Rationale, Specifics   33  20255 Therapeutic Exercise (timed):  increase ROM, strength, coordination, balance, and proprioception to improve patient's ability to progress to PLOF and address remaining functional goals. (see flow sheet as applicable)    Details if applicable:       8  36019 Manual Therapy (timed):  decrease pain, increase ROM, and increase tissue extensibility to improve patient's ability to progress to PLOF and address remaining functional goals.  The manual therapy interventions were performed at a

## 2024-10-21 ENCOUNTER — HOSPITAL ENCOUNTER (OUTPATIENT)
Facility: HOSPITAL | Age: 54
Setting detail: RECURRING SERIES
Discharge: HOME | End: 2024-10-24
Payer: COMMERCIAL

## 2024-10-21 PROCEDURE — 97140 MANUAL THERAPY 1/> REGIONS: CPT

## 2024-10-21 PROCEDURE — 97016 VASOPNEUMATIC DEVICE THERAPY: CPT

## 2024-10-21 PROCEDURE — 97110 THERAPEUTIC EXERCISES: CPT

## 2024-10-21 NOTE — PROGRESS NOTES
PHYSICAL / OCCUPATIONAL THERAPY - DAILY TREATMENT NOTE     Patient Name: Octavio Medina    Date: 10/21/2024    : 1970  Insurance: Payor: IZAARA / Plan: SENTARA PLUS PPO / Product Type: *No Product type* /      Patient  verified Yes     Visit #   Current / Total 3 24   Time   In / Out 5:10 5:53   Pain   In / Out 0/10 0/10   Subjective Functional Status/Changes: The patient states sleeping is a bit better. Denies any injury or sudden pains of let shoulder.   Changes to:  Allergies, Med Hx, Sx Hx?   no       TREATMENT AREA =  Left shoulder pain [M25.512]    If an interpreting service is utilized for treatment of this patient, the contents of this document represent the material reviewed with the patient via the .     OBJECTIVE  Modalities Rationale:     decrease edema, decrease inflammation, and decrease pain to improve patient's ability to progress to PLOF and address remaining functional goals.    10 min [x]  Vasopneumatic Device, press/temp:  LP/LT left shoulder    If using vaso (only need to measure limb vaso being performed on)        measured at (landmark location) :     Skin assessment post-treatment (if applicable):    []  intact    []  redness- no adverse reaction                 []redness - adverse reaction:         Therapeutic Procedures:  Tx Min Billable or 1:1 Min (if diff from Tx Min) Procedure, Rationale, Specifics   25  71734 Therapeutic Exercise (timed):  increase ROM, strength, coordination, balance, and proprioception to improve patient's ability to progress to PLOF and address remaining functional goals. (see flow sheet as applicable)    Details if applicable:       8  60914 Manual Therapy (timed):  decrease pain, increase ROM, and increase tissue extensibility to improve patient's ability to progress to PLOF and address remaining functional goals.  The manual therapy interventions were performed at a separate and distinct time from the therapeutic activities

## 2024-11-01 ENCOUNTER — HOSPITAL ENCOUNTER (OUTPATIENT)
Facility: HOSPITAL | Age: 54
Setting detail: RECURRING SERIES
Discharge: HOME | End: 2024-11-04
Payer: COMMERCIAL

## 2024-11-01 PROCEDURE — 97110 THERAPEUTIC EXERCISES: CPT

## 2024-11-01 PROCEDURE — 97016 VASOPNEUMATIC DEVICE THERAPY: CPT

## 2024-11-01 PROCEDURE — 97140 MANUAL THERAPY 1/> REGIONS: CPT

## 2024-11-01 NOTE — PROGRESS NOTES
[facility]  [address] [facility] [address] [place] [address] - Ph: [phone]   Fx: [phone]    PHYSICAL THERAPY PROGRESS NOTE      Patient name: Octavio Medina Start of Care: 10/3/2024   Referral source: Yong Chaparro MD : 1970               Medical Diagnosis: Left shoulder pain [M25.512]        Onset Date: 2024   Treatment Diagnosis: M25.512  LEFT SHOULDER PAIN                                     Prior Hospitalization: see medical history Provider#: [id number]   Medications: Verified on Patient Summary List      Comorbidities: Complications related to surgery: Patient had failed RCR on left shoulder in . Recent surgery performed with cadaver achilles tendon transfer to left lower trapezius.     Prior Level of Function: functionally independent, no AD, active lifestyle. 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        Reporting Period: 10/03/2024-2024  Visits from Start of Care: 4    Missed Visits: 0    Goals/Measure of Progress: To be achieved in 12 weeks:    Short Term Goals: To be accomplished in 12 treatments:  Patient will be independent and compliant with HEP to progress toward goals and restore functional mobility.   Eval Status: issued at eval   PN 2024: Met reports compliance   Patient will improve DASH score by 18 points to improve functional tolerance for exercise.   Eval Status: DASH 75%     PN 2024:50%.   Pt will have 3+/5 left shoulder flexion and abduction strength to return to goals of reaching up to kitchen cabinets.  Eval Status: Unable to move left shoulder actively per protocol.    PN 2024: Unable to assess due to protocol phase.   Pt will have painfree left shoulder PROM WFL to aid in functional mechanics for ADLs.  Eval Status:   Left   110   20   90   20   50    PN 2024: left shoulder flexion

## 2024-11-01 NOTE — PROGRESS NOTES
PHYSICAL / OCCUPATIONAL THERAPY - DAILY TREATMENT NOTE     Patient Name: Octavio Medina    Date: 2024    : 1970  Insurance: Payor: IZAARA / Plan: SENTARA PLUS PPO / Product Type: *No Product type* /      Patient  verified Yes     Visit #   Current / Total 4 24   Time   In / Out 7:10 7:50   Pain   In / Out 1/10 1/10   Subjective Functional Status/Changes: Pt reports no new complaints of pain.    Changes to:  Allergies, Med Hx, Sx Hx?   no       TREATMENT AREA =  Left shoulder pain [M25.512]    If an interpreting service is utilized for treatment of this patient, the contents of this document represent the material reviewed with the patient via the .     OBJECTIVE    Modalities Rationale:     decrease inflammation and decrease pain to improve patient's ability to progress to PLOF and address remaining functional goals.     min [] Estim Unattended, type/location:                                      []  w/ice    []  w/heat    min [] Estim Attended, type/location:                                     []  w/US     []  w/ice    []  w/heat    []  TENS insruct      min []  Mechanical Traction: type/lbs                   []  pro   []  sup   []  int   []  cont    []  before manual    []  after manual    min []  Ultrasound, settings/location:      min []  Iontophoresis w/ dexamethasone, location:                                               []  take home patch       []  in clinic        min  unbilled []  Ice     []  Heat    location/position:     min []  Paraffin,  details:    10 min []  Vasopneumatic Device, press/temp: Low/Low    min []  Whirlpool / Fluido:    If using vaso (only need to measure limb vaso being performed on)      pre-treatment girth :       post-treatment girth :       measured at (landmark location) :      min []  Other:    Skin assessment post-treatment (if applicable):    []  intact    []  redness- no adverse reaction                 []redness - adverse reaction:

## 2024-11-04 ENCOUNTER — HOSPITAL ENCOUNTER (OUTPATIENT)
Facility: HOSPITAL | Age: 54
Setting detail: RECURRING SERIES
Discharge: HOME | End: 2024-11-07
Payer: COMMERCIAL

## 2024-11-04 PROCEDURE — 97110 THERAPEUTIC EXERCISES: CPT

## 2024-11-04 PROCEDURE — 97140 MANUAL THERAPY 1/> REGIONS: CPT

## 2024-11-04 PROCEDURE — 97016 VASOPNEUMATIC DEVICE THERAPY: CPT

## 2024-11-04 PROCEDURE — 97112 NEUROMUSCULAR REEDUCATION: CPT

## 2024-11-04 NOTE — PROGRESS NOTES
order to progress to PLOF and address remaining functional goals. (see flow sheet as applicable)    Details if applicable:     8  37709 Manual Therapy (timed):  decrease pain, increase ROM, and increase tissue extensibility to improve patient's ability to progress to PLOF and address remaining functional goals.  The manual therapy interventions were performed at a separate and distinct time from the therapeutic activities interventions . Details: left GHJ inferior/posterior capsular mobs grade II flexion/scaption/ER        Details if applicable:     45  [facility] Totals Reminder: bill using total billable min of TIMED therapeutic procedures (example: do not include dry needle or estim unattended, both untimed codes, in totals to left)  8-22 min = 1 unit; 23-37 min = 2 units; 38-52 min = 3 units; 53-67 min = 4 units; 68-82 min = 5 units   Total Total     TOTAL TREATMENT TIME:        55     [x]  Patient Education billed concurrently with other procedures   [x] Review HEP    [] Progressed/Changed HEP, detail:    [] Other detail:       Objective Information/Functional Measures/Assessment  Progressed to greater degree of AAROM. The patient reported \"soreness\" post session, but denied pain and stated his shoulder felt better after the session. He left with all questions answered, updated HEP and issued to patient.    Patient will continue to benefit from skilled PT / OT services to modify and progress therapeutic interventions, analyze and address functional mobility deficits, analyze and address ROM deficits, analyze and address strength deficits, analyze and address soft tissue restrictions, analyze and cue for proper movement patterns, analyze and modify for postural abnormalities, and instruct in home and community integration to address functional deficits and attain remaining goals.    Progress toward goals / Updated goals:  []  See Progress Note/Recertification  Goals/Measure of Progress: To be achieved in 12

## 2024-11-07 ENCOUNTER — APPOINTMENT (OUTPATIENT)
Facility: HOSPITAL | Age: 54
End: 2024-11-07
Payer: COMMERCIAL

## 2024-11-11 ENCOUNTER — HOSPITAL ENCOUNTER (OUTPATIENT)
Facility: HOSPITAL | Age: 54
Setting detail: RECURRING SERIES
Discharge: HOME | End: 2024-11-14
Payer: COMMERCIAL

## 2024-11-11 PROCEDURE — 97110 THERAPEUTIC EXERCISES: CPT

## 2024-11-11 PROCEDURE — 97112 NEUROMUSCULAR REEDUCATION: CPT

## 2024-11-11 PROCEDURE — 97016 VASOPNEUMATIC DEVICE THERAPY: CPT

## 2024-11-11 PROCEDURE — 97140 MANUAL THERAPY 1/> REGIONS: CPT

## 2024-11-11 NOTE — PROGRESS NOTES
PHYSICAL / OCCUPATIONAL THERAPY - DAILY TREATMENT NOTE     Patient Name: Octavio Medina    Date: 2024    : 1970  Insurance: Payor: IZAARA / Plan: SENTARA PLUS PPO / Product Type: *No Product type* /      Patient  verified Yes     Visit #   Current / Total 5 24   Time   In / Out 5:10 6:07   Pain   In / Out 1/10 3/10   Subjective Functional Status/Changes: The patient reports that he continues to have difficulty with sleeping. No new changes.    Changes to:  Allergies, Med Hx, Sx Hx?   no       TREATMENT AREA =  Left shoulder pain [M25.512]    If an interpreting service is utilized for treatment of this patient, the contents of this document represent the material reviewed with the patient via the .    OBJECTIVE  Modalities Rationale:     decrease edema, decrease inflammation, and decrease pain to improve patient's ability to progress to PLOF and address remaining functional goals.    10 min [x]  Vasopneumatic Device, press/temp:  MP/LT left shoulder   If using vaso (only need to measure limb vaso being performed on)      pre-treatment girth :       post-treatment girth :       measured at (landmark location) :     Skin assessment post-treatment (if applicable):    []  intact    []  redness- no adverse reaction                 []redness - adverse reaction:         Therapeutic Procedures:  Tx Min Billable or 1:1 Min (if diff from Tx Min) Procedure, Rationale, Specifics   65 19853 Therapeutic Exercise (timed):  increase ROM, strength, coordination, balance, and proprioception to improve patient's ability to progress to PLOF and address remaining functional goals. (see flow sheet as applicable)    Details if applicable:       10  33192 Neuromuscular Re-Education (timed):  improve balance, coordination, kinesthetic sense, posture, core stability and proprioception to improve patient's ability to develop conscious control of individual muscles and awareness of position of extremities in

## 2024-11-14 ENCOUNTER — HOSPITAL ENCOUNTER (OUTPATIENT)
Facility: HOSPITAL | Age: 54
Setting detail: RECURRING SERIES
Discharge: HOME | End: 2024-11-17
Payer: COMMERCIAL

## 2024-11-14 PROCEDURE — 97112 NEUROMUSCULAR REEDUCATION: CPT

## 2024-11-14 PROCEDURE — 97110 THERAPEUTIC EXERCISES: CPT

## 2024-11-14 PROCEDURE — 97140 MANUAL THERAPY 1/> REGIONS: CPT

## 2024-11-14 NOTE — PROGRESS NOTES
assistance.    Patient will continue to benefit from skilled PT / OT services to modify and progress therapeutic interventions, analyze and address functional mobility deficits, analyze and address ROM deficits, analyze and address strength deficits, analyze and address soft tissue restrictions, and analyze and cue for proper movement patterns to address functional deficits and attain remaining goals.    Progress toward goals / Updated goals:  []  See Progress Note/Recertification    Goals/Measure of Progress: To be achieved in 12 weeks:  Short Term Goals: To be accomplished in 12 treatments:  Patient will be independent and compliant with HEP to progress toward goals and restore functional mobility.   Eval Status: issued at eval   PN 11/01/2024: Met reports compliance   Patient will improve DASH score by 18 points to improve functional tolerance for exercise.   Eval Status: DASH 75%     PN 11/01/2024: 50%.   Pt will have 3+/5 left shoulder flexion and abduction strength to return to goals of reaching up to kitchen cabinets.  Eval Status: Unable to move left shoulder actively per protocol.    PN 11/01/2024: Unable to assess due to protocol phase.   Pt will have painfree left shoulder PROM WFL to aid in functional mechanics for ADLs.  Eval Status:   Left   110   20   90   20   50    PN 11/01/2024: left shoulder flexion AAROM 143 degrees      Long Term Goals: To be accomplished in 24 treatments:  Patient will improve DASH score by 27 points to improve functional tolerance for ADLs and work activities.  Eval Status: DASH  75%   PN 11/01/2024 50%.   Pt will have painfree left shoulder AROM WFL to aid in functional mechanics for ADLs.  Eval Status: Unable to assess per protocol.  PN 11/01/2024: Not yet assessed per protocol.   Pt will have 4+/5 or greater general left shoulder strength to return to goals of lifting overhead, return to full participation in surgeries, and return to playing guitar.  Eval Status: Unable to

## 2024-11-18 ENCOUNTER — HOSPITAL ENCOUNTER (OUTPATIENT)
Facility: HOSPITAL | Age: 54
Setting detail: RECURRING SERIES
Discharge: HOME | End: 2024-11-21
Payer: COMMERCIAL

## 2024-11-18 PROCEDURE — 97140 MANUAL THERAPY 1/> REGIONS: CPT

## 2024-11-18 PROCEDURE — 97110 THERAPEUTIC EXERCISES: CPT

## 2024-11-18 PROCEDURE — 97112 NEUROMUSCULAR REEDUCATION: CPT

## 2024-11-18 NOTE — PROGRESS NOTES
PHYSICAL / OCCUPATIONAL THERAPY - DAILY TREATMENT NOTE     Patient Name: Octavio Medina    Date: 2024    : 1970  Insurance: Payor: IZAARA / Plan: S[place] PLUS PPO / Product Type: *No Product type* /      Patient  verified Yes     Visit #   Current / Total 7 24   Time   In / Out 7:13 8:05   Pain   In / Out 1/10 2/10   Subjective Functional Status/Changes: Pt reports 1/10 pain.  Pt states he had no increased symptoms over the weekend.    Changes to:  Allergies, Med Hx, Sx Hx?   no       TREATMENT AREA =  Left shoulder pain [M25.512]    If an interpreting service is utilized for treatment of this patient, the contents of this document represent the material reviewed with the patient via the .     OBJECTIVE    Modalities Rationale:     decrease inflammation and decrease pain to improve patient's ability to progress to PLOF and address remaining functional goals.     min [] Estim Unattended, type/location:                                      []  w/ice    []  w/heat    min [] Estim Attended, type/location:                                     []  w/US     []  w/ice    []  w/heat    []  TENS insruct      min []  Mechanical Traction: type/lbs                   []  pro   []  sup   []  int   []  cont    []  before manual    []  after manual    min []  Ultrasound, settings/location:      min []  Iontophoresis w/ dexamethasone, location:                                               []  take home patch       []  in clinic        min  unbilled []  Ice     []  Heat    location/position:     min []  Paraffin,  details:    10 min [x]  Vasopneumatic Device, press/temp: Low/Low    min []  Whirlpool / Fluido:    If using vaso (only need to measure limb vaso being performed on)      pre-treatment girth :       post-treatment girth :       measured at (landmark location) :      min []  Other:    Skin assessment post-treatment (if applicable):    []  intact    []  redness- no adverse reaction

## 2024-11-25 ENCOUNTER — HOSPITAL ENCOUNTER (OUTPATIENT)
Facility: HOSPITAL | Age: 54
Setting detail: RECURRING SERIES
Discharge: HOME | End: 2024-11-28
Payer: COMMERCIAL

## 2024-11-25 PROCEDURE — 97112 NEUROMUSCULAR REEDUCATION: CPT

## 2024-11-25 PROCEDURE — 97140 MANUAL THERAPY 1/> REGIONS: CPT

## 2024-11-25 PROCEDURE — 97530 THERAPEUTIC ACTIVITIES: CPT

## 2024-11-25 PROCEDURE — 97110 THERAPEUTIC EXERCISES: CPT

## 2024-11-25 PROCEDURE — 97016 VASOPNEUMATIC DEVICE THERAPY: CPT

## 2024-11-25 NOTE — PROGRESS NOTES
PHYSICAL / OCCUPATIONAL THERAPY - DAILY TREATMENT NOTE     Patient Name: Octavio Medina    Date: 2024    : 1970  Insurance: Payor: SENTARA / Plan: S[employer] PLUS PPO / Product Type: *No Product type* /      Patient  verified Yes     Visit #   Current / Total 9 24   Time   In / Out 5:52 6:46   Pain   In / Out 1/10 2/10   Subjective Functional Status/Changes: The patient states he can tell his shoulder is improving little by little.   Changes to:  Allergies, Med Hx, Sx Hx?   no       TREATMENT AREA =  Left shoulder pain [M25.512]    If an interpreting service is utilized for treatment of this patient, the contents of this document represent the material reviewed with the patient via the .     OBJECTIVE    Modalities Rationale:     decrease edema, decrease inflammation, and decrease pain to improve patient's ability to progress to PLOF and address remaining functional goals.    10 min [x]  Vasopneumatic Device, press/temp:  LP/LT left shoulder   Skin assessment post-treatment (if applicable):    []  intact    []  redness- no adverse reaction                 []redness - adverse reaction:         Therapeutic Procedures:  Tx Min Billable or 1:1 Min (if diff from Tx Min) Procedure, Rationale, Specifics   18  54940 Therapeutic Exercise (timed):  increase ROM, strength, coordination, balance, and proprioception to improve patient's ability to progress to PLOF and address remaining functional goals. (see flow sheet as applicable)    Details if applicable:       8  03202 Neuromuscular Re-Education (timed):  improve balance, coordination, kinesthetic sense, posture, core stability and proprioception to improve patient's ability to develop conscious control of individual muscles and awareness of position of extremities in order to progress to PLOF and address remaining functional goals. (see flow sheet as applicable)    Details if applicable:     8  12449 Manual Therapy (timed):  decrease pain,

## 2024-12-17 ENCOUNTER — HOSPITAL ENCOUNTER (OUTPATIENT)
Facility: HOSPITAL | Age: 54
Setting detail: RECURRING SERIES
Discharge: HOME | End: 2024-12-20
Payer: COMMERCIAL

## 2024-12-17 PROCEDURE — 97016 VASOPNEUMATIC DEVICE THERAPY: CPT

## 2024-12-17 PROCEDURE — 97110 THERAPEUTIC EXERCISES: CPT

## 2024-12-17 PROCEDURE — 97112 NEUROMUSCULAR REEDUCATION: CPT

## 2024-12-17 PROCEDURE — 97140 MANUAL THERAPY 1/> REGIONS: CPT

## 2024-12-17 NOTE — PROGRESS NOTES
[facility]  [address] [facility] [address] [place] [address] - Ph: [phone]   Fx: [phone]    PHYSICAL THERAPY PROGRESS NOTE      Patient name: Octavio Medina Start of Care: 10/3/2024   Referral source: Yong Chaparro MD : 1970    Medical Diagnosis: Left shoulder pain [M25.512]  Payor: SENTARA / Plan: S[employer]ARA PLUS PPO / Product Type: *No Product type* /  Onset Date:2024    Treatment Diagnosis:  M25.512  LEFT SHOULDER PAIN       Prior Hospitalization: see medical history Provider#: [id number]   Comorbidities:  Complications related to surgery: Patient had failed RCR on left shoulder in . Recent surgery performed with cadaver achilles tendon transfer to left lower trapezius.   Prior Level of Function: functionally independent, no AD, active lifestyle. 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     Reporting Period: 10/3/2024-2024  Visits from Start of Care: 10    Missed Visits: 0    Goals/Measure of Progress: To be achieved in 12 weeks:    Short Term Goals: To be accomplished in 12 treatments:  Patient will be independent and compliant with HEP to progress toward goals and restore functional mobility.   Eval Status: issued at eval   PN 2024: Met reports compliance   Patient will improve DASH score by 18 points to improve functional tolerance for exercise.   Eval Status: DASH 75%     PN 2024: 50%.   Pt will have 3+/5 left shoulder flexion and abduction strength to return to goals of reaching up to kitchen cabinets.  Eval Status: Unable to move left shoulder actively per protocol.    PN 2024: Unable to assess due to protocol phase.  Current 24: 3-/5 flex and abd   Pt will have painfree left shoulder PROM WFL to aid in functional mechanics for ADLs.  Eval Status:   Left   110   20   90   20   50    PN 2024: left 
activities.  Eval Status: DASH  75%   PN 50%.   Current: 12/17/24: NV  Pt will have painfree left shoulder AROM WFL to aid in functional mechanics for ADLs.  Eval Status: Unable to assess per protocol.  PN 11/25/2024: Not yet assessed per protocol.   Current 12/17/24: AROM flex 135 deg, abd 130 deg, ER 25 deg, IR 75 deg.  Pt will have 4+/5 or greater general left shoulder strength to return to goals of lifting overhead, return to full participation in surgeries, and return to playing guitar.  Eval Status: Unable to assess per protocol.   PN 11/25/2024: Unable to assess due to protocol phase.   Current: 3-/5 MMT flexion and abd   Patient will demonstrate ability to perform active IR to T10 and ER to C7 in order to perform personal hygiene and work towards full AROM.  Eval status: unable to assess. Right UE: IR T7. ER T4.  PN 11/25/2024: Unable to assess due to protocol phase.               Current: ANIL occiput, FIR T8 12/17/24    PLAN  Yes  Continue plan of care  []  Upgrade activities as tolerated  []  Discharge due to :  []  Other:    Christian Tamayo, PT    12/17/2024    5:15 PM    Future Appointments   Date Time Provider Department Center   12/26/2024  7:10 AM [facility] [place]   1/6/2025  5:50 PM Dominick Rodney PTA [facility] [place]   1/9/2025  5:10 PM Dakotah Carmen, [facility] [place]   1/13/2025  7:10 AM Mihai Nunez, PT [facility] [place]   1/16/2025  7:10 AM Mihai Nunez, PT [facility] [place]   3/10/2025  3:00 PM Tierra Young PA [facility] Sched

## 2024-12-26 ENCOUNTER — HOSPITAL ENCOUNTER (OUTPATIENT)
Facility: HOSPITAL | Age: 54
Setting detail: RECURRING SERIES
Discharge: HOME | End: 2024-12-29
Payer: COMMERCIAL

## 2024-12-26 PROCEDURE — 97530 THERAPEUTIC ACTIVITIES: CPT

## 2024-12-26 PROCEDURE — 97110 THERAPEUTIC EXERCISES: CPT

## 2024-12-26 PROCEDURE — 97016 VASOPNEUMATIC DEVICE THERAPY: CPT

## 2024-12-26 PROCEDURE — 97112 NEUROMUSCULAR REEDUCATION: CPT

## 2024-12-26 NOTE — PROGRESS NOTES
compliant with HEP to progress toward goals and restore functional mobility.   Eval Status: issued at eval   PN 11/01/2024: Met reports compliance   Patient will improve DASH score by 18 points to improve functional tolerance for exercise.   Eval Status: DASH 75%     PN 11/01/2024: 50%.   Pt will have 3+/5 left shoulder flexion and abduction strength to return to goals of reaching up to kitchen cabinets.  Eval Status: Unable to move left shoulder actively per protocol.    PN 11/01/2024: Unable to assess due to protocol phase.  Current 12/17/24: 3-/5 flex and abd   Pt will have painfree left shoulder PROM WFL to aid in functional mechanics for ADLs.  Eval Status:   Left   110   20   90   20   50    PN 11/01/2024: left shoulder flexion AAROM 143 degrees    Current: Met 11/25/2024   Long Term Goals: To be accomplished in 24 treatments:  Patient will improve DASH score by 27 points to improve functional tolerance for ADLs and work activities.  Eval Status: DASH  75%   PN 50%.   Current: 12/17/24: NV  Pt will have painfree left shoulder AROM WFL to aid in functional mechanics for ADLs.  Eval Status: Unable to assess per protocol.  PN 11/25/2024: Not yet assessed per protocol.   Current 12/17/24: AROM flex 135 deg, abd 130 deg, ER 25 deg, IR 75 deg.  Pt will have 4+/5 or greater general left shoulder strength to return to goals of lifting overhead, return to full participation in surgeries, and return to playing guitar.  Eval Status: Unable to assess per protocol.   PN 11/25/2024: Unable to assess due to protocol phase.   Current: Progressed to 3/5 MMT 12/26/2024  Patient will demonstrate ability to perform active IR to T10 and ER to C7 in order to perform personal hygiene and work towards full AROM.  Eval status: unable to assess. Right UE: IR T7. ER T4.  PN 11/25/2024: Unable to assess due to protocol phase.               Current: ANIL occiput, FIR T8 12/17/24    PLAN  Yes  Continue plan of care  []  Upgrade activities

## 2025-01-02 ENCOUNTER — TELEPHONE (OUTPATIENT)
Facility: CLINIC | Age: 55
End: 2025-01-02

## 2025-01-02 RX ORDER — ESCITALOPRAM OXALATE 20 MG/1
20 TABLET ORAL DAILY
Qty: 90 TABLET | Refills: 0 | Status: SHIPPED | OUTPATIENT
Start: 2025-01-02

## 2025-01-02 NOTE — TELEPHONE ENCOUNTER
Refill request via fax     Medication: escitalopram (LEXAPRO) 20 MG tablet   Quantity: 90  Pharmacy:   [facility]/pharmacy #[id number] - [place], [place] - [address] -     Last Fill: 9/22/2024    PCP: Angeli Jorge MD    LAST OFFICE VISIT: 8/2/2024 (pre op)       Future Appointments   Date Time Provider Department Center   1/6/2025  5:50 PM Dominick Rodney, [facility] [place]   1/9/2025  5:10 PM Dakotah Carmen PTA [facility]   1/13/2025  7:10 AM Mihai Nunez, PT [facility]   1/16/2025  7:10 AM Mihai Nunez, PT [facility]   3/10/2025  3:00 PM Tierra Young PA [facility] Sched

## 2025-01-06 ENCOUNTER — TELEPHONE (OUTPATIENT)
Facility: HOSPITAL | Age: 55
End: 2025-01-06

## 2025-01-06 ENCOUNTER — HOSPITAL ENCOUNTER (OUTPATIENT)
Facility: HOSPITAL | Age: 55
Setting detail: RECURRING SERIES
Discharge: HOME | End: 2025-01-09
Payer: COMMERCIAL

## 2025-01-06 PROCEDURE — 97016 VASOPNEUMATIC DEVICE THERAPY: CPT

## 2025-01-06 PROCEDURE — 97110 THERAPEUTIC EXERCISES: CPT

## 2025-01-06 PROCEDURE — 97530 THERAPEUTIC ACTIVITIES: CPT

## 2025-01-06 PROCEDURE — 97112 NEUROMUSCULAR REEDUCATION: CPT

## 2025-01-06 NOTE — PROGRESS NOTES
replicating functional movements to increase ROM, strength, coordination, balance, and proprioception in order to improve patient's ability to progress to PLOF and address remaining functional goals. (see flow sheet as applicable)     Details if applicable:     40  [facility] Totals Reminder: bill using total billable min of TIMED therapeutic procedures (example: do not include dry needle or estim unattended, both untimed codes, in totals to left)  8-22 min = 1 unit; 23-37 min = 2 units; 38-52 min = 3 units; 53-67 min = 4 units; 68-82 min = 5 units   Total Total     TOTAL TREATMENT TIME:        50     [x]  Patient Education billed concurrently with other procedures   [x] Review HEP    [] Progressed/Changed HEP, detail:    [] Other detail:       Objective Information/Functional Measures/Assessment    Shoulder AROM:  Flexion: 140  ABD: 130 degrees  ANIL: C2  FIR: T12    The patient is making good progress towards goals concerning improvements in flexion and ABD. His compensations, fatigue, and weakness are still evident during the session, but able to notably progress some interventions.     Patient will continue to benefit from skilled PT / OT services to modify and progress therapeutic interventions, analyze and address functional mobility deficits, analyze and address ROM deficits, analyze and address strength deficits, analyze and address soft tissue restrictions, analyze and cue for proper movement patterns, analyze and modify for postural abnormalities, and instruct in home and community integration to address functional deficits and attain remaining goals.    Progress toward goals / Updated goals:  []  See Progress Note/Recertification  Goals/Measure of Progress: To be achieved in 12 weeks:  Short Term Goals: To be accomplished in 12 treatments:  Patient will be independent and compliant with HEP to progress toward goals and restore functional mobility.   Eval Status: issued at eval   PN 11/01/2024: Met reports

## 2025-01-09 ENCOUNTER — HOSPITAL ENCOUNTER (OUTPATIENT)
Facility: HOSPITAL | Age: 55
Setting detail: RECURRING SERIES
Discharge: HOME | End: 2025-01-12
Payer: COMMERCIAL

## 2025-01-09 PROCEDURE — 97530 THERAPEUTIC ACTIVITIES: CPT

## 2025-01-09 PROCEDURE — 97110 THERAPEUTIC EXERCISES: CPT

## 2025-01-09 PROCEDURE — 97112 NEUROMUSCULAR REEDUCATION: CPT

## 2025-01-09 NOTE — PROGRESS NOTES
PHYSICAL / OCCUPATIONAL THERAPY - DAILY TREATMENT NOTE     Patient Name: Octavio Medina    Date: 2025    : 1970  Insurance: Payor: IZAARA / Plan: S[employer]ARA PLUS PPO / Product Type: *No Product type* /      Patient  verified Yes     Visit #   Current / Total 13 24   Time   In / Out 5:10 5:58   Pain   In / Out 0/10 1/10   Subjective Functional Status/Changes: Pt reports no new complaints    Changes to:  Allergies, Med Hx, Sx Hx?   no       TREATMENT AREA =  Left shoulder pain [M25.512]    If an interpreting service is utilized for treatment of this patient, the contents of this document represent the material reviewed with the patient via the .     OBJECTIVE    Modalities Rationale:     decrease inflammation and decrease pain to improve patient's ability to progress to PLOF and address remaining functional goals.    10 min [x]  Vasopneumatic Device, press/temp: Low/low   Skin assessment post-treatment (if applicable):    []  intact    []  redness- no adverse reaction                 []redness - adverse reaction:         Therapeutic Procedures:  Tx Min Billable or 1:1 Min (if diff from Tx Min) Procedure, Rationale, Specifics   18  03284 Therapeutic Exercise (timed):  increase ROM, strength, coordination, balance, and proprioception to improve patient's ability to progress to PLOF and address remaining functional goals. (see flow sheet as applicable)    Details if applicable:       10  27254 Neuromuscular Re-Education (timed):  improve balance, coordination, kinesthetic sense, posture, core stability and proprioception to improve patient's ability to develop conscious control of individual muscles and awareness of position of extremities in order to progress to PLOF and address remaining functional goals. (see flow sheet as applicable)    Details if applicable:     10  08395 Therapeutic Activity (timed):  use of dynamic activities replicating functional movements to increase ROM, strength,

## 2025-01-13 ENCOUNTER — HOSPITAL ENCOUNTER (OUTPATIENT)
Facility: HOSPITAL | Age: 55
Setting detail: RECURRING SERIES
Discharge: HOME | End: 2025-01-16
Payer: COMMERCIAL

## 2025-01-13 PROCEDURE — 97112 NEUROMUSCULAR REEDUCATION: CPT

## 2025-01-13 PROCEDURE — 97110 THERAPEUTIC EXERCISES: CPT

## 2025-01-13 PROCEDURE — 97530 THERAPEUTIC ACTIVITIES: CPT

## 2025-01-13 NOTE — PROGRESS NOTES
PHYSICAL / OCCUPATIONAL THERAPY - DAILY TREATMENT NOTE     Patient Name: Octavio Medina    Date: 2025    : 1970  Insurance: Payor: MRALA / Plan: [place] PLUS PPO / Product Type: *No Product type* /      Patient  verified Yes     Visit #   Current / Total 14 24   Time   In / Out 0710 0802   Pain   In / Out 0 1   Subjective Functional Status/Changes: The pt reports no significant pain. Reports limited ER.    Changes to:  Allergies, Med Hx, Sx Hx?   no       TREATMENT AREA =  Left shoulder pain [M25.512]    If an interpreting service is utilized for treatment of this patient, the contents of this document represent the material reviewed with the patient via the .     OBJECTIVE    Modalities Rationale:     decrease inflammation and decrease pain to improve patient's ability to progress to PLOF and address remaining functional goals.     min [] Estim Unattended, type/location:                                      []  w/ice    []  w/heat    min [] Estim Attended, type/location:                                     []  w/US     []  w/ice    []  w/heat    []  TENS insruct      min []  Mechanical Traction: type/lbs                   []  pro   []  sup   []  int   []  cont    []  before manual    []  after manual    min []  Ultrasound, settings/location:      min []  Iontophoresis w/ dexamethasone, location:                                               []  take home patch       []  in clinic        min  unbilled []  Ice     []  Heat    location/position:     min []  Paraffin,  details:    10 min []  Vasopneumatic Device, press/temp:  Low low    min []  Whirlpool / Fluido:    If using vaso (only need to measure limb vaso being performed on)      pre-treatment girth :       post-treatment girth :       measured at (landmark location) :      min []  Other:    Skin assessment post-treatment (if applicable):    []  intact    []  redness- no adverse reaction                 []redness - adverse

## 2025-01-16 ENCOUNTER — HOSPITAL ENCOUNTER (OUTPATIENT)
Facility: HOSPITAL | Age: 55
Setting detail: RECURRING SERIES
Discharge: HOME | End: 2025-01-19
Payer: COMMERCIAL

## 2025-01-16 PROCEDURE — 97110 THERAPEUTIC EXERCISES: CPT

## 2025-01-16 PROCEDURE — 97112 NEUROMUSCULAR REEDUCATION: CPT

## 2025-01-16 PROCEDURE — 97530 THERAPEUTIC ACTIVITIES: CPT

## 2025-01-16 NOTE — PROGRESS NOTES
PHYSICAL / OCCUPATIONAL THERAPY - DAILY TREATMENT NOTE     Patient Name: Octavio Medina    Date: 2025    : 1970  Insurance: Payor: IZAARA / Plan: S[place]ARA PLUS PPO / Product Type: *No Product type* /      Patient  verified Yes     Visit #   Current / Total 15 24   Time   In / Out 0710 0758   Pain   In / Out 0 1   Subjective Functional Status/Changes: The pt reports he did okay after last session. No pain at the moment.    Changes to:  Allergies, Med Hx, Sx Hx?   no       TREATMENT AREA =  Left shoulder pain [M25.512]    If an interpreting service is utilized for treatment of this patient, the contents of this document represent the material reviewed with the patient via the .     OBJECTIVE    Modalities Rationale:     decrease inflammation and decrease pain to improve patient's ability to progress to PLOF and address remaining functional goals.     min [] Estim Unattended, type/location:                                      []  w/ice    []  w/heat    min [] Estim Attended, type/location:                                     []  w/US     []  w/ice    []  w/heat    []  TENS insruct      min []  Mechanical Traction: type/lbs                   []  pro   []  sup   []  int   []  cont    []  before manual    []  after manual    min []  Ultrasound, settings/location:      min []  Iontophoresis w/ dexamethasone, location:                                               []  take home patch       []  in clinic        min  unbilled []  Ice     []  Heat    location/position:     min []  Paraffin,  details:    5 min [x]  Vasopneumatic Device, press/temp: Low low    min []  Whirlpool / Fluido:    If using vaso (only need to measure limb vaso being performed on)      pre-treatment girth :       post-treatment girth :       measured at (landmark location) :      min []  Other:    Skin assessment post-treatment (if applicable):    []  intact    []  redness- no adverse reaction                 []redness -

## 2025-01-24 ENCOUNTER — HOSPITAL ENCOUNTER (OUTPATIENT)
Facility: HOSPITAL | Age: 55
Setting detail: RECURRING SERIES
Discharge: HOME | End: 2025-01-27
Payer: COMMERCIAL

## 2025-01-24 PROCEDURE — 97112 NEUROMUSCULAR REEDUCATION: CPT

## 2025-01-24 PROCEDURE — 97110 THERAPEUTIC EXERCISES: CPT

## 2025-01-24 PROCEDURE — 97530 THERAPEUTIC ACTIVITIES: CPT

## 2025-01-24 NOTE — PROGRESS NOTES
PHYSICAL / OCCUPATIONAL THERAPY - DAILY TREATMENT NOTE     Patient Name: Octavio Medina    Date: 2025    : 1970  Insurance: Payor: IZAARA / Plan: S[employer]ARA PLUS PPO / Product Type: *No Product type* /      Patient  verified Yes     Visit #   Current / Total 16 24   Time   In / Out 0710 0800   Pain   In / Out 0 0   Subjective Functional Status/Changes: The pt reports no significant pain currently.    Changes to:  Allergies, Med Hx, Sx Hx?   no       TREATMENT AREA =  Left shoulder pain [M25.512]    If an interpreting service is utilized for treatment of this patient, the contents of this document represent the material reviewed with the patient via the .     OBJECTIVE    Modalities Rationale:     decrease inflammation and decrease pain to improve patient's ability to progress to PLOF and address remaining functional goals.     min [] Estim Unattended, type/location:                                      []  w/ice    []  w/heat    min [] Estim Attended, type/location:                                     []  w/US     []  w/ice    []  w/heat    []  TENS insruct      min []  Mechanical Traction: type/lbs                   []  pro   []  sup   []  int   []  cont    []  before manual    []  after manual    min []  Ultrasound, settings/location:      min []  Iontophoresis w/ dexamethasone, location:                                               []  take home patch       []  in clinic        min  unbilled []  Ice     []  Heat    location/position:     min []  Paraffin,  details:    10 min [x]  Vasopneumatic Device, press/temp: Low low    min []  Whirlpool / Fluido:    If using vaso (only need to measure limb vaso being performed on)      pre-treatment girth :       post-treatment girth :       measured at (landmark location) :      min []  Other:    Skin assessment post-treatment (if applicable):    []  intact    []  redness- no adverse reaction                 []redness - adverse reaction:

## 2025-01-27 ENCOUNTER — HOSPITAL ENCOUNTER (OUTPATIENT)
Facility: HOSPITAL | Age: 55
Setting detail: RECURRING SERIES
Discharge: HOME | End: 2025-01-30
Payer: COMMERCIAL

## 2025-01-27 PROCEDURE — 97530 THERAPEUTIC ACTIVITIES: CPT

## 2025-01-27 PROCEDURE — 97112 NEUROMUSCULAR REEDUCATION: CPT

## 2025-01-27 PROCEDURE — 97110 THERAPEUTIC EXERCISES: CPT

## 2025-01-27 NOTE — PROGRESS NOTES
PHYSICAL / OCCUPATIONAL THERAPY - DAILY TREATMENT NOTE     Patient Name: Octavio Medina    Date: 2025    : 1970  Insurance: Payor: SENTARA / Plan: SENTARA PLUS PPO / Product Type: *No Product type* /      Patient  verified Yes     Visit #   Current / Total 17 24   Time   In / Out 5:13 5:50   Pain   In / Out 0/10 0/10   Subjective Functional Status/Changes: The patient reports his shoulder is gradually getting stronger.   Changes to:  Allergies, Med Hx, Sx Hx?   no       TREATMENT AREA =  Left shoulder pain [M25.512]    If an interpreting service is utilized for treatment of this patient, the contents of this document represent the material reviewed with the patient via the .     OBJECTIVE  Therapeutic Procedures:  Tx Min Billable or 1:1 Min (if diff from Tx Min) Procedure, Rationale, Specifics   21  71980 Therapeutic Exercise (timed):  increase ROM, strength, coordination, balance, and proprioception to improve patient's ability to progress to PLOF and address remaining functional goals. (see flow sheet as applicable)    Details if applicable:       8  51495 Neuromuscular Re-Education (timed):  improve balance, coordination, kinesthetic sense, posture, core stability and proprioception to improve patient's ability to develop conscious control of individual muscles and awareness of position of extremities in order to progress to PLOF and address remaining functional goals. (see flow sheet as applicable)    Details if applicable:     8  10969 Therapeutic Activity (timed):  use of dynamic activities replicating functional movements to increase ROM, strength, coordination, balance, and proprioception in order to improve patient's ability to progress to PLOF and address remaining functional goals.  (see flow sheet as applicable)     Details if applicable:     37  [facility] Totals Reminder: bill using total billable min of TIMED therapeutic procedures (example: do not include dry needle or

## 2025-01-28 NOTE — PROGRESS NOTES
[facility]  [address]  [place] [address] [phone] fax    Continued Plan of Care/ Re-certification for Physical Therapy Services      Patient name: Octavio Medina Start of Care: 10/3/2024   Referral source: Yong Chaparro MD : 1970               Medical Diagnosis: Left shoulder pain [M25.512]  Payor: [employer] / Plan: [employer]US PPO / Product Type: *No Product type* /  Onset Date:2024               Treatment Diagnosis:  M25.512  LEFT SHOULDER PAIN       Prior Hospitalization: see medical history Provider#: [id number]   Comorbidities:  Complications related to surgery: Patient had failed RCR on left shoulder in . Recent surgery performed with cadaver achilles tendon transfer to left lower trapezius.   Prior Level of Function: functionally independent, no AD, active lifestyle. 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     Visits from Start of Care: 17 Missed Visits: 0    Reporting Period: 10/03/2025 to 2025    The Plan of Care and following information is based on the patient's current status:    Key functional changes:   Shoulder AROM:  Flexion: 145 degrees  ABD: 152 degrees   ANIL: C3  FIR: T7     Strength:  Flexion: 4/5 MMT  ABD: 4/5 MMT  ER: 4/5 MMT  IR: 5/5 MMT      Quick DASH: 34%%     The patient is progressing concerning his AROM and strength slowly. He has appreciated improvement both functionally and noting progress in his ROM. His Quick DASH score has improved to 34%. The patient will continue to benefit from PT in order to progress strength and improve ease of overhead activity.    Problems/ barriers to goal attainment: Significant involvement and massive RTC repair with allograft and lower trap transfer.     Problem List: pain affecting function, decrease ROM, decrease strength, decrease ADL/functional abilities,

## 2025-01-29 ENCOUNTER — APPOINTMENT (OUTPATIENT)
Facility: HOSPITAL | Age: 55
End: 2025-01-29
Payer: COMMERCIAL

## 2025-01-31 ENCOUNTER — HOSPITAL ENCOUNTER (OUTPATIENT)
Facility: HOSPITAL | Age: 55
Setting detail: RECURRING SERIES
End: 2025-01-31
Payer: COMMERCIAL

## 2025-01-31 PROCEDURE — 97016 VASOPNEUMATIC DEVICE THERAPY: CPT

## 2025-01-31 PROCEDURE — 97110 THERAPEUTIC EXERCISES: CPT

## 2025-01-31 PROCEDURE — 97112 NEUROMUSCULAR REEDUCATION: CPT

## 2025-01-31 PROCEDURE — 97530 THERAPEUTIC ACTIVITIES: CPT

## 2025-01-31 NOTE — PROGRESS NOTES
PHYSICAL / OCCUPATIONAL THERAPY - DAILY TREATMENT NOTE     Patient Name: Octavio Medina    Date: 2025    : 1970  Ins[facility] / Plan: [employer] PLUS PPO / Product Type: *No Product type* /      Patient  verified Yes     Visit #   Current / Total 18 41   Time   In / Out 7:09 8:00   Pain   In / Out 0/10 0/10   Subjective Functional Status/Changes: No new changes reported.   Changes to:  Allergies, Med Hx, Sx Hx?   no     TREATMENT AREA =  Left shoulder pain [M25.512]    If an interpreting service is utilized for treatment of this patient, the contents of this document represent the material reviewed with the patient via the .     OBJECTIVE  Modalities Rationale:     decrease edema, decrease inflammation, and decrease pain to improve patient's ability to progress to PLOF and address remaining functional goals.    10 min [x]  Vasopneumatic Device, press/temp:  LP/LT left shoulder   Skin assessment post-treatment (if applicable):    []  intact    []  redness- no adverse reaction                 []redness - adverse reaction:         Therapeutic Procedures:  Tx Min Billable or 1:1 Min (if diff from Tx Min) Procedure, Rationale, Specifics   23  [id number] Therapeutic Exercise (timed):  increase ROM, strength, coordination, balance, and proprioception to improve patient's ability to progress to PLOF and address remaining functional goals. (see flow sheet as applicable)    Details if applicable:       8  [id number] Neuromuscular Re-Education (timed):  improve balance, coordination, kinesthetic sense, posture, core stability and proprioception to improve patient's ability to develop conscious control of individual muscles and awareness of position of extremities in order to progress to PLOF and address remaining functional goals. (see flow sheet as applicable)    Details if applicable:     10  26662 Therapeutic Activity (timed):  use of dynamic activities replicating functional movements to

## 2025-02-05 ENCOUNTER — HOSPITAL ENCOUNTER (OUTPATIENT)
Facility: HOSPITAL | Age: 55
Setting detail: RECURRING SERIES
Discharge: HOME | End: 2025-02-08
Payer: COMMERCIAL

## 2025-02-05 PROCEDURE — 97110 THERAPEUTIC EXERCISES: CPT

## 2025-02-05 PROCEDURE — 97530 THERAPEUTIC ACTIVITIES: CPT

## 2025-02-05 PROCEDURE — 97016 VASOPNEUMATIC DEVICE THERAPY: CPT

## 2025-02-05 PROCEDURE — 97112 NEUROMUSCULAR REEDUCATION: CPT

## 2025-02-05 NOTE — PROGRESS NOTES
PHYSICAL / OCCUPATIONAL THERAPY - DAILY TREATMENT NOTE     Patient Name: Octavio Medina    Date: 2025    : 1970  Insurance: Payor: [employer] / Plan: [employer] PLUS PPO / Product Type: *No Product type* /      Patient  verified Yes     Visit #   Current / Total 19 41   Time   In / Out 5:12 6:05   Pain   In / Out 1/10 010   Subjective Functional Status/Changes: The patient denies new complaints upon arrival.    Changes to:  Allergies, Med Hx, Sx Hx?   no       TREATMENT AREA =  Left shoulder pain [M25.512]    If an interpreting service is utilized for treatment of this patient, the contents of this document represent the material reviewed with the patient via the .     OBJECTIVE  Modality rationale: decrease edema, decrease inflammation, and decrease pain to improve the patient’s ability to improve ADL ease.    Min Type Additional Details     10 [x]  Vasopneumatic Device  Pre-treatment girth:  Post-treatment girth:  Measured at (location):  Pressure:       [x] lo [] med [] hi   Temperature: [x] lo [] med [] hi   [] Skin assessment post-treatment:  []intact []redness- no adverse reaction    []redness - adverse reaction:     Therapeutic Procedures:  Tx Min Billable or 1:1 Min (if diff from Tx Min) Procedure, Rationale, Specifics   26  [id number] Therapeutic Exercise (timed):  increase ROM, strength, coordination, balance, and proprioception to improve patient's ability to progress to PLOF and address remaining functional goals. (see flow sheet as applicable)    Details if applicable:       9  38133 Neuromuscular Re-Education (timed):  improve balance, coordination, kinesthetic sense, posture, core stability and proprioception to improve patient's ability to develop conscious control of individual muscles and awareness of position of extremities in order to progress to PLOF and address remaining functional goals. (see flow sheet as applicable)    Details if applicable:     8  51448 Therapeutic

## 2025-02-07 ENCOUNTER — HOSPITAL ENCOUNTER (OUTPATIENT)
Facility: HOSPITAL | Age: 55
Setting detail: RECURRING SERIES
Discharge: HOME | End: 2025-02-10
Payer: COMMERCIAL

## 2025-02-07 PROCEDURE — 97016 VASOPNEUMATIC DEVICE THERAPY: CPT

## 2025-02-07 PROCEDURE — 97530 THERAPEUTIC ACTIVITIES: CPT

## 2025-02-07 PROCEDURE — 97112 NEUROMUSCULAR REEDUCATION: CPT

## 2025-02-07 PROCEDURE — 97110 THERAPEUTIC EXERCISES: CPT

## 2025-02-07 NOTE — PROGRESS NOTES
Eval Status: issued at eval   PN 11/01/2024: Met reports compliance   Patient will improve DASH score by 18 points to improve functional tolerance for exercise.   Eval Status: DASH 75%    Re-cert 1/27/2025: 34%.   Pt will have 3+/5 left shoulder flexion and abduction strength to return to goals of reaching up to kitchen cabinets.  Eval Status: Unable to move left shoulder actively per protocol.    PN 11/01/2024: Unable to assess due to protocol phase.  Re-cert: : Met 4/5 MMT  Pt will have painfree left shoulder PROM WFL to aid in functional mechanics for ADLs.  Eval Status:   Left   110   20   90   20   50    PN 11/01/2024: left shoulder flexion AAROM 143 degrees    Current: Met 11/25/2024   Long Term Goals: To be accomplished in 24 treatments:  Patient will improve DASH score by 27 points to improve functional tolerance for ADLs and work activities.  Eval Status: DASH  75%   PN 50%.   Re-cert:  34%     Pt will have painfree left shoulder AROM WFL to aid in functional mechanics for ADLs.  Eval Status: Unable to assess per protocol.  PN 11/25/2024: Not yet assessed per protocol.   Re-cert: Progressing   Shoulder AROM:  Flexion: 145 degrees  ABD: 152 degrees   ANIL: C3  FIR: T7     Current 2/05/2025 : ANIL: C2      Pt will have 4+/5 or greater general left shoulder strength to return to goals of lifting overhead, return to full participation in surgeries, and return to playing guitar.  Eval Status: Unable to assess per protocol.  Re-cert:Strength:  Flexion: 4/5 MMT  ABD: 4/5 MMT  ER: 4/5 MMT  IR: 5/5 MMT     Current: 4/5 MMT ER 2/07/2025     Patient will demonstrate ability to perform active IR to T10 and ER to C7 in order to perform personal hygiene and work towards full AROM.  Eval status: unable to assess. Right UE: IR T7. ER T4.  Re-cert: Progressing  ANIL: C3  FIR: T7    PLAN  Yes  Continue plan of care  []  Upgrade activities as tolerated  []  Discharge due to :  []  Other:    Chris Mcmanus, PT    2/7/2025    Detail Level: Detailed Detail Level: Zone

## 2025-02-11 ENCOUNTER — HOSPITAL ENCOUNTER (OUTPATIENT)
Facility: HOSPITAL | Age: 55
Setting detail: RECURRING SERIES
End: 2025-02-11
Payer: COMMERCIAL

## 2025-02-11 ENCOUNTER — TELEPHONE (OUTPATIENT)
Facility: HOSPITAL | Age: 55
End: 2025-02-11

## 2025-02-11 NOTE — TELEPHONE ENCOUNTER
Pt left a VM stating that something came up with a patient and he can't make the appt this morning.

## 2025-02-13 ENCOUNTER — HOSPITAL ENCOUNTER (OUTPATIENT)
Facility: HOSPITAL | Age: 55
Setting detail: RECURRING SERIES
Discharge: HOME | End: 2025-02-16
Payer: COMMERCIAL

## 2025-02-13 PROCEDURE — 97530 THERAPEUTIC ACTIVITIES: CPT

## 2025-02-13 PROCEDURE — 97112 NEUROMUSCULAR REEDUCATION: CPT

## 2025-02-13 PROCEDURE — 97110 THERAPEUTIC EXERCISES: CPT

## 2025-02-13 PROCEDURE — 97016 VASOPNEUMATIC DEVICE THERAPY: CPT

## 2025-02-13 NOTE — PROGRESS NOTES
[facility]  [address]  [place] [address] [phone] fax    Physical Therapy Discharge Summary  Patient name: Octavio Medina Start of Care: 10/3/2024   Referral source: Yong Chaparro MD : 1970               Medical Diagnosis: Left shoulder pain [M25.512]  Payor: [employer] / Plan: [employer] PLUS PPO / Product Type: *No Product type* /  Onset Date:2024               Treatment Diagnosis:  M25.512  LEFT SHOULDER PAIN       Prior Hospitalization: see medical history Provider#: [id number]   Comorbidities:  Complications related to surgery: Patient had failed RCR on left shoulder in . Recent surgery performed with cadaver achilles tendon transfer to left lower trapezius.   Prior Level of Function: functionally independent, no AD, active lifestyle. 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      Visits from Start of Care: 21   Missed Visits: 1    Reporting Period : 10/03/2024 to 2025    Goals/Measure of Progress:  Progress toward goals / Updated goals:  []  See Progress Note/Recertification:  Goals for this certification period to be accomplished in 12 weeks  Short Term Goals: To be accomplished in 12 treatments:  Patient will be independent and compliant with HEP to progress toward goals and restore functional mobility.   Eval Status: issued at eval   PN 2024: Met reports compliance   Patient will improve DASH score by 18 points to improve functional tolerance for exercise.   Eval Status: DASH 75%    Re-cert 2025: 34%.   Pt will have 3+/5 left shoulder flexion and abduction strength to return to goals of reaching up to kitchen cabinets.  Eval Status: Unable to move left shoulder actively per protocol.    PN 2024: Unable to assess due to protocol phase.  Re-cert: : Met 4/5 MMT  Pt will have painfree left shoulder PROM WFL to

## 2025-02-13 NOTE — PROGRESS NOTES
PHYSICAL / OCCUPATIONAL THERAPY - DAILY TREATMENT NOTE     Patient Name: Octavio Medina    Date: 2025    : 1970  Insurance: Payor: [employer]ARA / Plan: [employer] PLUS PPO / Product Type: *No Product type* /      Patient  verified Yes     Visit #   Current / Total 21 41   Time   In / Out 7:09 8:02   Pain   In / Out 0/10 0/10   Subjective Functional Status/Changes: No new complaints.   Changes to:  Allergies, Med Hx, Sx Hx?   no       TREATMENT AREA =  Left shoulder pain [M25.512]    If an interpreting service is utilized for treatment of this patient, the contents of this document represent the material reviewed with the patient via the .     OBJECTIVE  Modalities Rationale:     decrease edema, decrease inflammation, and decrease pain to improve patient's ability to progress to PLOF and address remaining functional goals.    10 min [x]  Vasopneumatic Device, press/temp:  LP/LT left shoulder   If using vaso (only need to measure limb vaso being performed on)      pre-treatment girth :       post-treatment girth :       measured at (landmark location) :      min []  Other:    Skin assessment post-treatment (if applicable):    []  intact    []  redness- no adverse reaction                 []redness - adverse reaction:         Therapeutic Procedures:  Tx Min Billable or 1:1 Min (if diff from Tx Min) Procedure, Rationale, Specifics     [id number] Therapeutic Exercise (timed):  increase ROM, strength, coordination, balance, and proprioception to improve patient's ability to progress to PLOF and address remaining functional goals. (see flow sheet as applicable)    Details if applicable:       12  [id number] Neuromuscular Re-Education (timed):  improve balance, coordination, kinesthetic sense, posture, core stability and proprioception to improve patient's ability to develop conscious control of individual muscles and awareness of position of extremities in order to progress to PLOF and address remaining

## 2025-06-23 RX ORDER — ESCITALOPRAM OXALATE 20 MG/1
20 TABLET ORAL DAILY
Qty: 90 TABLET | Refills: 0 | OUTPATIENT
Start: 2025-06-23